# Patient Record
Sex: MALE | Race: OTHER | HISPANIC OR LATINO | URBAN - METROPOLITAN AREA
[De-identification: names, ages, dates, MRNs, and addresses within clinical notes are randomized per-mention and may not be internally consistent; named-entity substitution may affect disease eponyms.]

---

## 2019-02-16 ENCOUNTER — EMERGENCY (EMERGENCY)
Facility: HOSPITAL | Age: 54
LOS: 1 days | Discharge: HOME | End: 2019-02-16
Attending: EMERGENCY MEDICINE
Payer: MEDICAID

## 2019-02-16 VITALS
WEIGHT: 145.06 LBS | TEMPERATURE: 98 F | HEART RATE: 81 BPM | HEIGHT: 68 IN | RESPIRATION RATE: 20 BRPM | DIASTOLIC BLOOD PRESSURE: 97 MMHG | SYSTOLIC BLOOD PRESSURE: 153 MMHG | OXYGEN SATURATION: 96 %

## 2019-02-16 LAB
ALBUMIN SERPL ELPH-MCNC: 4.5 G/DL — SIGNIFICANT CHANGE UP (ref 3.3–5.2)
ALP SERPL-CCNC: 71 U/L — SIGNIFICANT CHANGE UP (ref 40–120)
ALT FLD-CCNC: 16 U/L — SIGNIFICANT CHANGE UP
ANION GAP SERPL CALC-SCNC: 12 MMOL/L — SIGNIFICANT CHANGE UP (ref 5–17)
APTT BLD: 28.5 SEC — SIGNIFICANT CHANGE UP (ref 27.5–36.3)
AST SERPL-CCNC: 16 U/L — SIGNIFICANT CHANGE UP
BILIRUB SERPL-MCNC: 0.3 MG/DL — LOW (ref 0.4–2)
BUN SERPL-MCNC: 13 MG/DL — SIGNIFICANT CHANGE UP (ref 8–20)
CALCIUM SERPL-MCNC: 9.6 MG/DL — SIGNIFICANT CHANGE UP (ref 8.6–10.2)
CHLORIDE SERPL-SCNC: 102 MMOL/L — SIGNIFICANT CHANGE UP (ref 98–107)
CO2 SERPL-SCNC: 23 MMOL/L — SIGNIFICANT CHANGE UP (ref 22–29)
CREAT SERPL-MCNC: 0.98 MG/DL — SIGNIFICANT CHANGE UP (ref 0.5–1.3)
GLUCOSE SERPL-MCNC: 97 MG/DL — SIGNIFICANT CHANGE UP (ref 70–115)
HCT VFR BLD CALC: 43.8 % — SIGNIFICANT CHANGE UP (ref 42–52)
HGB BLD-MCNC: 15.4 G/DL — SIGNIFICANT CHANGE UP (ref 14–18)
INR BLD: 1.01 RATIO — SIGNIFICANT CHANGE UP (ref 0.88–1.16)
MAGNESIUM SERPL-MCNC: 1.8 MG/DL — SIGNIFICANT CHANGE UP (ref 1.6–2.6)
MCHC RBC-ENTMCNC: 32.9 PG — HIGH (ref 27–31)
MCHC RBC-ENTMCNC: 35.2 G/DL — SIGNIFICANT CHANGE UP (ref 32–36)
MCV RBC AUTO: 93.6 FL — SIGNIFICANT CHANGE UP (ref 80–94)
NT-PROBNP SERPL-SCNC: 19 PG/ML — SIGNIFICANT CHANGE UP (ref 0–300)
PLATELET # BLD AUTO: 310 K/UL — SIGNIFICANT CHANGE UP (ref 150–400)
POTASSIUM SERPL-MCNC: 3.8 MMOL/L — SIGNIFICANT CHANGE UP (ref 3.5–5.3)
POTASSIUM SERPL-SCNC: 3.8 MMOL/L — SIGNIFICANT CHANGE UP (ref 3.5–5.3)
PROT SERPL-MCNC: 7.4 G/DL — SIGNIFICANT CHANGE UP (ref 6.6–8.7)
PROTHROM AB SERPL-ACNC: 11.6 SEC — SIGNIFICANT CHANGE UP (ref 10–12.9)
RBC # BLD: 4.68 M/UL — SIGNIFICANT CHANGE UP (ref 4.6–6.2)
RBC # FLD: 12.7 % — SIGNIFICANT CHANGE UP (ref 11–15.6)
SODIUM SERPL-SCNC: 137 MMOL/L — SIGNIFICANT CHANGE UP (ref 135–145)
TROPONIN T SERPL-MCNC: <0.01 NG/ML — SIGNIFICANT CHANGE UP (ref 0–0.06)
WBC # BLD: 10.8 K/UL — SIGNIFICANT CHANGE UP (ref 4.8–10.8)
WBC # FLD AUTO: 10.8 K/UL — SIGNIFICANT CHANGE UP (ref 4.8–10.8)

## 2019-02-16 PROCEDURE — 99220: CPT

## 2019-02-16 PROCEDURE — 71045 X-RAY EXAM CHEST 1 VIEW: CPT | Mod: 26

## 2019-02-16 PROCEDURE — 93010 ELECTROCARDIOGRAM REPORT: CPT | Mod: 76

## 2019-02-16 RX ORDER — NICOTINE POLACRILEX 2 MG
1 GUM BUCCAL ONCE
Qty: 0 | Refills: 0 | Status: COMPLETED | OUTPATIENT
Start: 2019-02-16 | End: 2019-02-16

## 2019-02-16 RX ORDER — METOPROLOL TARTRATE 50 MG
25 TABLET ORAL DAILY
Qty: 0 | Refills: 0 | Status: DISCONTINUED | OUTPATIENT
Start: 2019-02-16 | End: 2019-02-17

## 2019-02-16 RX ORDER — ASPIRIN/CALCIUM CARB/MAGNESIUM 324 MG
81 TABLET ORAL ONCE
Qty: 0 | Refills: 0 | Status: COMPLETED | OUTPATIENT
Start: 2019-02-16 | End: 2019-02-16

## 2019-02-16 RX ADMIN — Medication 81 MILLIGRAM(S): at 20:19

## 2019-02-16 RX ADMIN — Medication 25 MILLIGRAM(S): at 23:18

## 2019-02-16 RX ADMIN — Medication 1 PATCH: at 23:17

## 2019-02-16 NOTE — ED CDU PROVIDER INITIAL DAY NOTE - MEDICAL DECISION MAKING DETAILS
obs for suspcisous chest pain with some risk factors; tele monitor; serial enzymes; echo; cards consult

## 2019-02-16 NOTE — ED PROVIDER NOTE - CLINICAL SUMMARY MEDICAL DECISION MAKING FREE TEXT BOX
chest pain with risk facotrs; [facility] paged for consult in AM; will place on obs for serial ecg and troponins; cards consult

## 2019-02-16 NOTE — ED ADULT NURSE NOTE - OBJECTIVE STATEMENT
Patient is alert and verbal, report chest pain onset a few days ago but was worse today. patient took own aspirin at home. report improvement at this time. hx: HTN

## 2019-02-16 NOTE — ED CDU PROVIDER INITIAL DAY NOTE - OBJECTIVE STATEMENT
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 [place]; since then the pain is present every day, non radiating, but occurs randomly, cannot recall association with activity; no assoc diaphoresis, no leg pain/swelling, no recent travel; no SOB; +took aspirin today  pt is smokes less than a pack a day  since age 23 ,  smokes marijuana since age 23 , pt states drink 6 pack of beer every day, denies using any other illicit drugs  pt denies any leg swollen - cough or cough blood or any hx of blood clotting in lung or legs   pt states he had stress test done x 3 years ago in [facility] ( can not recall the dr name or group  as per pt was negative result) pt denies any chest pain now

## 2019-02-16 NOTE — ED PROVIDER NOTE - OBJECTIVE STATEMENT
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

## 2019-02-16 NOTE — ED ADULT NURSE NOTE - CAS EDN DISCHARGE ASSESSMENT
Dressing clean and dry/Patient baseline mental status/Alert and oriented to person, place and time/Awake

## 2019-02-17 VITALS
OXYGEN SATURATION: 99 % | TEMPERATURE: 98 F | DIASTOLIC BLOOD PRESSURE: 91 MMHG | HEART RATE: 77 BPM | SYSTOLIC BLOOD PRESSURE: 155 MMHG | RESPIRATION RATE: 18 BRPM

## 2019-02-17 DIAGNOSIS — R07.89 OTHER CHEST PAIN: ICD-10-CM

## 2019-02-17 LAB
TROPONIN T SERPL-MCNC: <0.01 NG/ML — SIGNIFICANT CHANGE UP (ref 0–0.06)
TROPONIN T SERPL-MCNC: <0.01 NG/ML — SIGNIFICANT CHANGE UP (ref 0–0.06)

## 2019-02-17 PROCEDURE — 83735 ASSAY OF MAGNESIUM: CPT

## 2019-02-17 PROCEDURE — 85730 THROMBOPLASTIN TIME PARTIAL: CPT

## 2019-02-17 PROCEDURE — 85610 PROTHROMBIN TIME: CPT

## 2019-02-17 PROCEDURE — 93005 ELECTROCARDIOGRAM TRACING: CPT

## 2019-02-17 PROCEDURE — G0378: CPT

## 2019-02-17 PROCEDURE — 93306 TTE W/DOPPLER COMPLETE: CPT

## 2019-02-17 PROCEDURE — 80053 COMPREHEN METABOLIC PANEL: CPT

## 2019-02-17 PROCEDURE — 85027 COMPLETE CBC AUTOMATED: CPT

## 2019-02-17 PROCEDURE — 71045 X-RAY EXAM CHEST 1 VIEW: CPT

## 2019-02-17 PROCEDURE — 99217: CPT

## 2019-02-17 PROCEDURE — 83880 ASSAY OF NATRIURETIC PEPTIDE: CPT

## 2019-02-17 PROCEDURE — 84484 ASSAY OF TROPONIN QUANT: CPT

## 2019-02-17 PROCEDURE — 99284 EMERGENCY DEPT VISIT MOD MDM: CPT | Mod: 25

## 2019-02-17 PROCEDURE — 93306 TTE W/DOPPLER COMPLETE: CPT | Mod: 26

## 2019-02-17 PROCEDURE — 93010 ELECTROCARDIOGRAM REPORT: CPT

## 2019-02-17 PROCEDURE — 36415 COLL VENOUS BLD VENIPUNCTURE: CPT

## 2019-02-17 RX ORDER — FAMOTIDINE 10 MG/ML
20 INJECTION INTRAVENOUS ONCE
Qty: 0 | Refills: 0 | Status: COMPLETED | OUTPATIENT
Start: 2019-02-17 | End: 2019-02-17

## 2019-02-17 RX ORDER — FAMOTIDINE 10 MG/ML
20 INJECTION INTRAVENOUS ONCE
Qty: 0 | Refills: 0 | Status: DISCONTINUED | OUTPATIENT
Start: 2019-02-17 | End: 2019-02-17

## 2019-02-17 RX ORDER — METOPROLOL TARTRATE 50 MG
1 TABLET ORAL
Qty: 60 | Refills: 0 | OUTPATIENT
Start: 2019-02-17 | End: 2019-03-18

## 2019-02-17 RX ORDER — ASPIRIN/CALCIUM CARB/MAGNESIUM 324 MG
81 TABLET ORAL DAILY
Qty: 0 | Refills: 0 | Status: DISCONTINUED | OUTPATIENT
Start: 2019-02-17 | End: 2019-02-21

## 2019-02-17 RX ORDER — METOPROLOL TARTRATE 50 MG
25 TABLET ORAL
Qty: 0 | Refills: 0 | Status: DISCONTINUED | OUTPATIENT
Start: 2019-02-17 | End: 2019-02-21

## 2019-02-17 RX ADMIN — FAMOTIDINE 20 MILLIGRAM(S): 10 INJECTION INTRAVENOUS at 05:10

## 2019-02-17 RX ADMIN — Medication 1 PATCH: at 06:33

## 2019-02-17 RX ADMIN — Medication 30 MILLILITER(S): at 05:10

## 2019-02-17 RX ADMIN — Medication 25 MILLIGRAM(S): at 05:09

## 2019-02-17 NOTE — ED CDU PROVIDER SUBSEQUENT DAY NOTE - MEDICAL DECISION MAKING DETAILS
52 y/o M with risk factors for CAD, left sided CP for several weeks, and negative troponin x 3.  Echo pending, will need stress test outpatient as per cardiology consult.

## 2019-02-17 NOTE — ED CDU PROVIDER SUBSEQUENT DAY NOTE - HISTORY
Report and care accepted from Ashely PEÑA.  ED, CDU, and cardiology notes, and results reviewed.  52 y/o M presented to ED with c/o several week of sharp intermittent left sided chest pain  without radiation of pain.  He has a 30 pack year smoking history, HTN, and a twin brother that  at 42 of MI.  At present time denies chest pain or SOB.

## 2019-02-17 NOTE — CONSULT NOTE ADULT - PROBLEM SELECTOR RECOMMENDATION 9
-cardiac vs GI vs musculoskeletal etiology  -telemetry overnight  -troponin x3   -nitro prn for CP  -Mylanta/Pepcid x1  -start Metoprolol 25 mg bid  -continue ASA 81 mg daily  -multiple risk factors for CAD-->will need echo and stress test, likely outpatient

## 2019-02-17 NOTE — ED CDU PROVIDER SUBSEQUENT DAY NOTE - ATTENDING CONTRIBUTION TO CARE
Seen and evaluated with ACP, case discussed:  52 y/o  male presented for chest pain, intermittent x several weeks, sharp without radiation  significant smoking hx  twin brother who  of MI by patient report  placed in observation for diagnostic intensity  included serial trop/ecg and cardiology testing  trop neg x 3, ecg unchanged  seen by cardiology, report reviewed by ACP  for inpatient echo with disposition   for outpatient stress test if echo within normal limits

## 2019-02-17 NOTE — ED CDU PROVIDER DISPOSITION NOTE - CLINICAL COURSE
54 y/o M with pmh HTN, current smoker and twin brother that  of MI at age 42.  Patient denies CP or SOB, echo reviewed.  Will need to f/u with cardiology for outpatient stress test.  Metoprolol 25 mg BID and asa 81 mg daily.  Smoking cessation discussed with patient.

## 2019-02-17 NOTE — ED CDU PROVIDER DISPOSITION NOTE - ATTENDING CONTRIBUTION TO CARE
seen and evaluated with acp  placed in obs for chest pain  serial trop negative  cardiology consulted  echo results reviewed, cleared for d/c  for outpatient stress test as per cardiology  patient understands and agrees with plan

## 2019-02-17 NOTE — ED ADULT NURSE REASSESSMENT NOTE - NS ED NURSE REASSESS COMMENT FT1
2nd trop resulted negative. 3rd trop blood draw scheduled for 0600. Pt resting in stretcher in NAD, NSR on cardiac monitor, oxygen saturation WNL.
3rd troponin resulted negative. Pt denies any chest pain at this time. Bed locked in lowest position, call bell within reach.
EKG completed and given to MARIANA Ridley. Pt requesting nicotine patch, pt smokes 1 pack a day. Order for nicotine patch received from PA, RN administered. BP elevated, PA made aware, metoprolol ordered. All questions and concerns addressed. Comfort measures provided including pillow and blanket. Pt resting in stretcher in NAD, pt wife at bedside.
Pt alert and oriented, no apparent distress noted at this time. Pt handed off to atilio POON in stable condition.
assumed care of pt @ 2147, report received from SAUD, RN, charting as noted. Pt AOx4 in NAD. HR is NSR on cardiac monitor, pt denies any chest pain at this time. lung sounds are clear b/l, abd is soft and nontender with positive bowel sounds in all four quadrants, skin is warm, dry and appropriate for age and race. pt educated on plan of care and observation stay. Plan of care taught back to RN. Proficiency determined from successful pt teach back. Pt oriented to unit, staff, and room. Pt reeducated on call bell use. Bed locked in lowest position, call bell within reach. All questions and concerns addressed.
pt resting in stretcher in NAD at this time. Bed locked in lowest position, call bell within reach.
verbal report rec'd from CDU RN Josi, assumed care of pt, no obvious distress noted

## 2019-02-17 NOTE — CONSULT NOTE ADULT - SUBJECTIVE AND OBJECTIVE BOX
History obtained by: Patient and medical record     obtained: No    Chief complaint:  "I have chest pain on and off"    HPI:  This is 54 y/o male with hx of HTN who presents with chest pain for a few weeks. Pain is non-radiating, localized to one spot on the left side, intermittent and not associated with any particular activity. Deep breathing and stretching out makes it better. Pt denies SOB, palpitations or leg swelling but endorses a lot of gas and burping. He occasionally takes omeprazole for his symptoms. Pt has a hx of HTN and used to take Toprol and Ramipril but his doctor discontinued them about 3 years ago. Pt denies any prior cardiac issues. He had an exercise stress test a couple of years ago. Pt is a current daily tobacco and marihuana smoker and drinks 6 beers a day. His identical twin brother  of acute MI at age 42.   In the ER pt was found to have elevated BP (153/97), first troponin negative and normal proBNP. EKG shows SR 81 bpm with no ST changes, no ectopy on telemetry.      REVIEW OF SYMPTOMS:   Cardiovascular: as per HPI  Respiratory:   denies dyspnea,   cough,     Genitourinary:  No dysuria, no hematuria;   Gastrointestinal:   No dark color stool, no melena, no diarrhea, no constipation, no abdominal pain;   Neurological: No headache, no dizziness, no slurred speech;    Psychiatric: No agitation, no anxiety.  ALL OTHER REVIEW OF SYSTEMS ARE NEGATIVE.    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 as     CIGARETTES: smokes 1 ppd for 30 years    ALCOHOL: drinks 6 beers a day    DRUGS: marihuana daily      Vital Signs Last 24 Hrs  T(C): 36.4 (2019 22:33), Max: 37 (2019 19:21)  T(F): 97.6 (2019 22:33), Max: 98.6 (2019 19:21)  HR: 77 (2019 22:33) (77 - 81)  BP: 153/93 (2019 22:33) (153/93 - 158/99)  BP(mean): --  RR: 18 (2019 22:33) (18 - 20)  SpO2: 98% (2019 22:33) (96% - 99%)    PHYSICAL EXAM:  General: WN/WD NAD  Neurology: A&Ox3, nonfocal, HUGHES x 4  Eyes: PERRL/ EOMI, Gross vision intact  ENT/Neck: Neck supple, trachea midline, No JVD, Gross hearing intact  Respiratory: CTA B/L, No wheezing, rales, rhonchi  CV: RRR, S1S2, no murmurs  Abdominal: Soft, NT, ND +BS,   Extremities: No edema, + peripheral pulses  Skin: No Rashes, Hematoma, Ecchymosis      INTERPRETATION OF TELEMETRY: SR 70's-80's, no ectopy    ECG: SR 81 bpm, no ST changes      I&O's Detail      LABS:                        15.4   10.8  )-----------( 310      ( 2019 19:46 )             43.8     02-16    137  |  102  |  13.0  ----------------------------<  97  3.8   |  23.0  |  0.98    Ca    9.6      2019 19:46  Mg     1.8     02-16    TPro  7.4  /  Alb  4.5  /  TBili  0.3<L>  /  DBili  x   /  AST  16  /  ALT  16  /  AlkPhos  71  02-16    CARDIAC MARKERS ( 2019 00:06 )  x     / <0.01 ng/mL / x     / x     / x      CARDIAC MARKERS ( 2019 19:46 )  x     / <0.01 ng/mL / x     / x     / x          PT/INR - ( 2019 19:46 )   PT: 11.6 sec;   INR: 1.01 ratio         PTT - ( 2019 19:46 )  PTT:28.5 sec    I&O's Summary      RADIOLOGY & ADDITIONAL STUDIES:  X-ray:    PREVIOUS DIAGNOSTIC TESTING:      ECHO: n/a    STRESS: n/a      CATHETERIZATION: n/a

## 2019-03-07 ENCOUNTER — INPATIENT (INPATIENT)
Facility: HOSPITAL | Age: 54
LOS: 0 days | Discharge: HOME | DRG: 287 | End: 2019-03-08
Attending: HOSPITALIST | Admitting: INTERNAL MEDICINE
Payer: MEDICAID

## 2019-03-07 VITALS
HEIGHT: 68 IN | DIASTOLIC BLOOD PRESSURE: 99 MMHG | HEART RATE: 92 BPM | RESPIRATION RATE: 18 BRPM | WEIGHT: 145.06 LBS | TEMPERATURE: 99 F | SYSTOLIC BLOOD PRESSURE: 146 MMHG

## 2019-03-07 DIAGNOSIS — I25.10 ATHEROSCLEROTIC HEART DISEASE OF NATIVE CORONARY ARTERY WITHOUT ANGINA PECTORIS: ICD-10-CM

## 2019-03-07 LAB
ALBUMIN SERPL ELPH-MCNC: 4.4 G/DL — SIGNIFICANT CHANGE UP (ref 3.3–5.2)
ALP SERPL-CCNC: 80 U/L — SIGNIFICANT CHANGE UP (ref 40–120)
ALT FLD-CCNC: 24 U/L — SIGNIFICANT CHANGE UP
ANION GAP SERPL CALC-SCNC: 14 MMOL/L — SIGNIFICANT CHANGE UP (ref 5–17)
APPEARANCE UR: CLEAR — SIGNIFICANT CHANGE UP
APTT BLD: 26.3 SEC — LOW (ref 27.5–36.3)
AST SERPL-CCNC: 19 U/L — SIGNIFICANT CHANGE UP
BILIRUB SERPL-MCNC: 0.4 MG/DL — SIGNIFICANT CHANGE UP (ref 0.4–2)
BILIRUB UR-MCNC: NEGATIVE — SIGNIFICANT CHANGE UP
BUN SERPL-MCNC: 12 MG/DL — SIGNIFICANT CHANGE UP (ref 8–20)
CALCIUM SERPL-MCNC: 9.4 MG/DL — SIGNIFICANT CHANGE UP (ref 8.6–10.2)
CHLORIDE SERPL-SCNC: 103 MMOL/L — SIGNIFICANT CHANGE UP (ref 98–107)
CO2 SERPL-SCNC: 22 MMOL/L — SIGNIFICANT CHANGE UP (ref 22–29)
COLOR SPEC: YELLOW — SIGNIFICANT CHANGE UP
CREAT SERPL-MCNC: 1.05 MG/DL — SIGNIFICANT CHANGE UP (ref 0.5–1.3)
DIFF PNL FLD: ABNORMAL
EPI CELLS # UR: SIGNIFICANT CHANGE UP
GLUCOSE SERPL-MCNC: 128 MG/DL — HIGH (ref 70–115)
GLUCOSE UR QL: NEGATIVE MG/DL — SIGNIFICANT CHANGE UP
HCT VFR BLD CALC: 43.2 % — SIGNIFICANT CHANGE UP (ref 42–52)
HGB BLD-MCNC: 15 G/DL — SIGNIFICANT CHANGE UP (ref 14–18)
INR BLD: 0.92 RATIO — SIGNIFICANT CHANGE UP (ref 0.88–1.16)
KETONES UR-MCNC: ABNORMAL
LEUKOCYTE ESTERASE UR-ACNC: NEGATIVE — SIGNIFICANT CHANGE UP
MCHC RBC-ENTMCNC: 32.5 PG — HIGH (ref 27–31)
MCHC RBC-ENTMCNC: 34.7 G/DL — SIGNIFICANT CHANGE UP (ref 32–36)
MCV RBC AUTO: 93.7 FL — SIGNIFICANT CHANGE UP (ref 80–94)
NITRITE UR-MCNC: NEGATIVE — SIGNIFICANT CHANGE UP
NT-PROBNP SERPL-SCNC: 11 PG/ML — SIGNIFICANT CHANGE UP (ref 0–300)
PH UR: 6 — SIGNIFICANT CHANGE UP (ref 5–8)
PLATELET # BLD AUTO: 279 K/UL — SIGNIFICANT CHANGE UP (ref 150–400)
POTASSIUM SERPL-MCNC: 4.2 MMOL/L — SIGNIFICANT CHANGE UP (ref 3.5–5.3)
POTASSIUM SERPL-SCNC: 4.2 MMOL/L — SIGNIFICANT CHANGE UP (ref 3.5–5.3)
PROT SERPL-MCNC: 7 G/DL — SIGNIFICANT CHANGE UP (ref 6.6–8.7)
PROT UR-MCNC: NEGATIVE MG/DL — SIGNIFICANT CHANGE UP
PROTHROM AB SERPL-ACNC: 10.6 SEC — SIGNIFICANT CHANGE UP (ref 10–12.9)
RBC # BLD: 4.61 M/UL — SIGNIFICANT CHANGE UP (ref 4.6–6.2)
RBC # FLD: 12.3 % — SIGNIFICANT CHANGE UP (ref 11–15.6)
SODIUM SERPL-SCNC: 139 MMOL/L — SIGNIFICANT CHANGE UP (ref 135–145)
SP GR SPEC: 1.02 — SIGNIFICANT CHANGE UP (ref 1.01–1.02)
TROPONIN T SERPL-MCNC: <0.01 NG/ML — SIGNIFICANT CHANGE UP (ref 0–0.06)
UROBILINOGEN FLD QL: NEGATIVE MG/DL — SIGNIFICANT CHANGE UP
WBC # BLD: 10.4 K/UL — SIGNIFICANT CHANGE UP (ref 4.8–10.8)
WBC # FLD AUTO: 10.4 K/UL — SIGNIFICANT CHANGE UP (ref 4.8–10.8)

## 2019-03-07 PROCEDURE — 99406 BEHAV CHNG SMOKING 3-10 MIN: CPT

## 2019-03-07 PROCEDURE — 93010 ELECTROCARDIOGRAM REPORT: CPT

## 2019-03-07 PROCEDURE — 71045 X-RAY EXAM CHEST 1 VIEW: CPT | Mod: 26

## 2019-03-07 PROCEDURE — 99223 1ST HOSP IP/OBS HIGH 75: CPT | Mod: 25

## 2019-03-07 PROCEDURE — 99285 EMERGENCY DEPT VISIT HI MDM: CPT

## 2019-03-07 PROCEDURE — 75574 CT ANGIO HRT W/3D IMAGE: CPT | Mod: 26

## 2019-03-07 PROCEDURE — 99255 IP/OBS CONSLTJ NEW/EST HI 80: CPT

## 2019-03-07 RX ORDER — METOPROLOL TARTRATE 50 MG
25 TABLET ORAL
Qty: 0 | Refills: 0 | Status: DISCONTINUED | OUTPATIENT
Start: 2019-03-07 | End: 2019-03-08

## 2019-03-07 RX ORDER — ASPIRIN/CALCIUM CARB/MAGNESIUM 324 MG
81 TABLET ORAL DAILY
Qty: 0 | Refills: 0 | Status: DISCONTINUED | OUTPATIENT
Start: 2019-03-07 | End: 2019-03-08

## 2019-03-07 RX ORDER — NITROGLYCERIN 6.5 MG
1 CAPSULE, EXTENDED RELEASE ORAL ONCE
Qty: 0 | Refills: 0 | Status: COMPLETED | OUTPATIENT
Start: 2019-03-07 | End: 2019-03-07

## 2019-03-07 RX ORDER — FAMOTIDINE 10 MG/ML
20 INJECTION INTRAVENOUS ONCE
Qty: 0 | Refills: 0 | Status: COMPLETED | OUTPATIENT
Start: 2019-03-07 | End: 2019-03-07

## 2019-03-07 RX ORDER — METOPROLOL TARTRATE 50 MG
50 TABLET ORAL ONCE
Qty: 0 | Refills: 0 | Status: COMPLETED | OUTPATIENT
Start: 2019-03-07 | End: 2019-03-07

## 2019-03-07 RX ORDER — ONDANSETRON 8 MG/1
4 TABLET, FILM COATED ORAL EVERY 6 HOURS
Qty: 0 | Refills: 0 | Status: DISCONTINUED | OUTPATIENT
Start: 2019-03-07 | End: 2019-03-08

## 2019-03-07 RX ORDER — HYDRALAZINE HCL 50 MG
10 TABLET ORAL EVERY 8 HOURS
Qty: 0 | Refills: 0 | Status: DISCONTINUED | OUTPATIENT
Start: 2019-03-07 | End: 2019-03-08

## 2019-03-07 RX ORDER — NICOTINE POLACRILEX 2 MG
1 GUM BUCCAL DAILY
Qty: 0 | Refills: 0 | Status: DISCONTINUED | OUTPATIENT
Start: 2019-03-07 | End: 2019-03-08

## 2019-03-07 RX ORDER — ACETAMINOPHEN 500 MG
650 TABLET ORAL EVERY 6 HOURS
Qty: 0 | Refills: 0 | Status: DISCONTINUED | OUTPATIENT
Start: 2019-03-07 | End: 2019-03-08

## 2019-03-07 RX ORDER — ATORVASTATIN CALCIUM 80 MG/1
40 TABLET, FILM COATED ORAL AT BEDTIME
Qty: 0 | Refills: 0 | Status: DISCONTINUED | OUTPATIENT
Start: 2019-03-07 | End: 2019-03-08

## 2019-03-07 RX ORDER — ASPIRIN/CALCIUM CARB/MAGNESIUM 324 MG
325 TABLET ORAL ONCE
Qty: 0 | Refills: 0 | Status: COMPLETED | OUTPATIENT
Start: 2019-03-07 | End: 2019-03-07

## 2019-03-07 RX ADMIN — Medication 325 MILLIGRAM(S): at 12:31

## 2019-03-07 RX ADMIN — FAMOTIDINE 20 MILLIGRAM(S): 10 INJECTION INTRAVENOUS at 12:31

## 2019-03-07 RX ADMIN — Medication 50 MILLIGRAM(S): at 15:24

## 2019-03-07 RX ADMIN — Medication 50 MILLIGRAM(S): at 15:07

## 2019-03-07 RX ADMIN — Medication 1 INCH(S): at 19:27

## 2019-03-07 NOTE — ED PROVIDER NOTE - CHPI ED SYMPTOMS NEG
no shortness of breath/no dizziness/no back pain/no cough/no fever/no nausea/no syncope/no diaphoresis/no vomiting/no chills

## 2019-03-07 NOTE — ED ADULT TRIAGE NOTE - CHIEF COMPLAINT QUOTE
patient reports left sided chest pain. Patient reports he has had this pain for past couple weeks, pain is intermittent stabbing. associated increased belching and gas

## 2019-03-07 NOTE — ED ADULT NURSE NOTE - CHIEF COMPLAINT
I, the Attending Physician, certify the above stated patient is homebound and upon completion of the Face-To-Face encounter, has the need for intermittent skilled nursing, physical therapy and/or speech or occupational therapy services in their home for their current diagnosis as outlined in their initiial plan of care. These services will continue to be monitored by myself or another physician The patient is a 53y Male complaining of chest pain.

## 2019-03-07 NOTE — ED ADULT NURSE NOTE - OBJECTIVE STATEMENT
A&Ox3, resp wnl, c/o light pain to left chest , c/o gas pain, belching, appears comfortable, denies shortness of breathe

## 2019-03-07 NOTE — H&P ADULT - HISTORY OF PRESENT ILLNESS
Pt is a 54 yo M presenting from home with chest pain, PMH Tobacco dependence, HTN.   Patient states for the last 4 weeks he has been having worsening chest pain, he states the pain is the anterior sternum, feels like a tightness worse with exertion and relieved with rest Pt is a 52 yo M presenting from home with chest pain, PMH Tobacco dependence, HTN.   Patient states for the last 4 weeks he has been having worsening chest pain, he states the pain is the anterior sternum, feels like a tightness worse with exertion and relieved with rest, denies PND or orthopnea, positive fam hx of early CAD in patients brother who  at 42 of an MI.   Patient is an active smoker up to 1ppd, he denies any diaphoresis or SOB, no radiation of pain elsewhere. No past hx of MI, angina prior to one month ago.   Of note was seen in ED around , trop negative and discharge to home recommended outpat stress test but patient did not follow up.   Denies headaches, nausea, vomiting, SOB, palpitations, abdominal pain, constipation, diarrhea, melena, hematochezia, dysuria. His chest pain is currently resolved.    In ED patient had Coronary CT done which showed obstructive disease, planned for Cath in AM by cardio.

## 2019-03-07 NOTE — ED PROVIDER NOTE - CLINICAL SUMMARY MEDICAL DECISION MAKING FREE TEXT BOX
The patient presents with atypical chest pain but CTCA is positive for obstructive CAD and will admit for further evaluation

## 2019-03-07 NOTE — H&P ADULT - ASSESSMENT
Pt is a 52 yo M presenting from home with chest pain found to have abnormal cardiac CT, PMH Tobacco dependence, HTN.     Chest Pain: likely stable angina.   -Cardiac CT shows Coronary artery calcium score:480. >90th percentile. Significant coronary   calcification.  -trend troponin, EKG without acute ST-T wave changes, no signs of ischemia.   -plan for Cath in AM.  -Check TTE  -resume ASA and Lopressor, start statin, check lipids and A1c  -NPO after midnight  -will hold pharm DVT ppx until after cath.    HTN: BP elevated, hydralazine PRN  -patient received nitro paste which will also help with BP    Tobacco Dependence: Patient was counselled on tobacco cessation. He was informed of the increased risk of lung cancer and cardiovascular disease, COPD among other health risks associated with smoking and tobacco use. Cessation was advised. 3 min spent on counselling. agreeable to nicoderm Pt is a 52 yo M presenting from home with chest pain found to have abnormal cardiac CT, PMH Tobacco dependence, HTN.     Chest Pain: likely stable angina.   -Cardiac CT shows Coronary artery calcium score:480. >90th percentile. Significant coronary   calcification.  -trend troponin, EKG without acute ST-T wave changes, no signs of ischemia.   -plan for Cath in AM.  -Check TTE  -resume ASA and Lopressor, start statin, check lipids and A1c  -NPO after midnight  -will hold pharm DVT ppx until after cath.    HTN: BP elevated, hydralazine PRN  -patient received nitro paste which will also help with BP    Tobacco Dependence: Patient was counselled on tobacco cessation. He was informed of the increased risk of lung cancer and cardiovascular disease, COPD among other health risks associated with smoking and tobacco use. Cessation was advised. 3 min spent on counselling. agreeable to nicoderm    DVT ppx: SCD's, will avoid pharm DVT as patient planned for cath in AM    Full code.

## 2019-03-07 NOTE — ED PROVIDER NOTE - OBJECTIVE STATEMENT
The patient is a 53 year old male presents with chest pain described as burning, pressure intermittent. No SOB, No ABD Pain, The patient is a 53 year old male presents with chest pain described as burning, pressure intermittent. No SOB, No ABD Pain, The patient has history of HTN, HLD, brother  of MI at age of 42

## 2019-03-07 NOTE — ED ADULT NURSE REASSESSMENT NOTE - NS ED NURSE REASSESS COMMENT FT1
pt returned from Main CT, awaiting results, CM sr VR 68-70, denies any pain, comfortable, family at bedside
Pt back from cardiac CTA.  Cardiac monitor showing normal sinus rhythm, hypertension 183/105.  Pt has no change in chest pain.  Mild sharp stabbing pain unchanged since arrival.  Nitro BId applied to chest wall.  will continue to monitor and repeat blood pressure.  Pt verbalized understanding of plan of care including NPO after midnight for AM cardiac catheterization.  Given meal tray.

## 2019-03-07 NOTE — CONSULT NOTE ADULT - SUBJECTIVE AND OBJECTIVE BOX
CARDIOLOGY CONSULTATION NOTE     History obtained by: Patient, family and medical record     obtained: No    Chief complaint:    Patient is a 53y old  Male who presents with a chief complaint of CP    HPI:    53M smoker with hx HTN, HLD (Untreated), family history of premature CAD (Identical twin brother  of MI at age 42) presented to ED with 3 weeks history of CP which has been on and off, felt like sharp pains, in the L side, lasting few minutes  and off, nonexertional, nonradiated denied modifying factors, denied associated sign and symptoms  He was seen for similar symptoms in the ER 3 weeks ago and was ruled out for MI, outpt stress test was recommended. He didn't have insurance, thus couldn't have the test done, stating he had insurance approval just recently Denied fever, chills dyspnea, orthopnea PND, edema, palpitation hematuria, melena syncope, near syncope.      REVIEW OF SYMPTOMS:   Constitutional: Denied: fever, chills, weight loss or gain  Eyes: Denied: reddened ayes, eye discharge, eye pain  ENMT: Denied: ear pain, nasal discharge, mouth pain, throat pain or swelling  Cardiovascular: Denied: palpitation, arrhythmia, irregular or fast heart beats, edema  Respiratory: Denied: cough, phlegm production, wheezes, dyspnea, orthopnea, PND,   GI: Denied: Abdominal pain, nausea, vomiting, diarrhea, constipation, melena, rectal bleed  : Denied: hematuria, frequency  Musculoskeletal: Denied: Muscle aches, weakness, pain  Hematology/lymp: Denied: Bleeding disorder, anemia, blood clotting  Neuro: Denied: Headache, light headedness, dizziness, numbness, aphasia, dysarthria, seizure,  syncope, near syncope  Psych: Denied: depression, anxiety  Integumentary/skin: Denied: rash, bruises, ecchymosis, itching  Allergy/Immunology: denied environmental or drug allergies    ALL OTHER REVIEW OF SYSTEMS ARE NEGATIVE.    MEDICATIONS  (STANDING):  Metoprolol 25mg twice daily    MEDICATIONS  (PRN):        PAST MEDICAL & SURGICAL HISTORY:  HTN (hypertension)  No significant past surgical history      FAMILY HISTORY:  + Premature CAD, brother  of MI at age 42    SOCIAL HISTORY: +Tobacco smoker    CIGARETTES:  YES    ALCOHOL: No    DRUGS: No    Vital Signs Last 24 Hrs  T(C): 37 (07 Mar 2019 11:12), Max: 37 (07 Mar 2019 11:12)  T(F): 98.6 (07 Mar 2019 11:12), Max: 98.6 (07 Mar 2019 11:12)  HR: 92 (07 Mar 2019 11:12) (92 - 92)  BP: 146/99 (07 Mar 2019 11:12) (146/99 - 146/99)  BP(mean): --  RR: 18 (07 Mar 2019 12:37) (18 - 18)  SpO2: 100% (07 Mar 2019 12:37) (100% - 100%)    PHYSICAL EXAM:      Constitutional: No fever, chills, NAD, Comfortable    Eyes: Not reddened, no discharge    ENMT: No discharge, No pain    Neck: No JVD, No Bruit    Back: No CVA tenderness    Respiratory: Clear to auscultation bilaterally    Cardiovascular: RRR, Normal S1-2, No S3-, No friction rub murmur    Gastrointestinal: Soft, NT/ND. BS+, No organomegaly    Extremities: No edema    Vascular: Distal pulses intact    Neurological: Alert, awake, oriented, able to move extremities, speach is clear    Skin: No ecchymosis, no rash    Musculoskeletal: Non tender, no weaknss    Psychiatric: Aproppriate mood, no anxiety        INTERPRETATION OF TELEMETRY:    ECG: SR    I&O's Detail      LABS:                        15.0   10.4  )-----------( 279      ( 07 Mar 2019 12:21 )             43.2     03-07    139  |  103  |  12.0  ----------------------------<  128<H>  4.2   |  22.0  |  1.05    Ca    9.4      07 Mar 2019 12:21    TPro  7.0  /  Alb  4.4  /  TBili  0.4  /  DBili  x   /  AST  19  /  ALT  24  /  AlkPhos  80  03-07    CARDIAC MARKERS ( 07 Mar 2019 12:21 )  x     / <0.01 ng/mL / x     / x     / x          PT/INR - ( 07 Mar 2019 12:21 )   PT: 10.6 sec;   INR: 0.92 ratio         PTT - ( 07 Mar 2019 12:21 )  PTT:26.3 sec    I&O's Summary

## 2019-03-08 ENCOUNTER — TRANSCRIPTION ENCOUNTER (OUTPATIENT)
Age: 54
End: 2019-03-08

## 2019-03-08 VITALS — DIASTOLIC BLOOD PRESSURE: 91 MMHG | HEART RATE: 73 BPM | SYSTOLIC BLOOD PRESSURE: 160 MMHG

## 2019-03-08 DIAGNOSIS — I20.9 ANGINA PECTORIS, UNSPECIFIED: ICD-10-CM

## 2019-03-08 DIAGNOSIS — E78.5 HYPERLIPIDEMIA, UNSPECIFIED: ICD-10-CM

## 2019-03-08 LAB
ANION GAP SERPL CALC-SCNC: 14 MMOL/L — SIGNIFICANT CHANGE UP (ref 5–17)
BASOPHILS # BLD AUTO: 0 K/UL — SIGNIFICANT CHANGE UP (ref 0–0.2)
BASOPHILS NFR BLD AUTO: 0.2 % — SIGNIFICANT CHANGE UP (ref 0–2)
BUN SERPL-MCNC: 14 MG/DL — SIGNIFICANT CHANGE UP (ref 8–20)
CALCIUM SERPL-MCNC: 9.4 MG/DL — SIGNIFICANT CHANGE UP (ref 8.6–10.2)
CHLORIDE SERPL-SCNC: 101 MMOL/L — SIGNIFICANT CHANGE UP (ref 98–107)
CHOLEST SERPL-MCNC: 234 MG/DL — HIGH (ref 110–199)
CO2 SERPL-SCNC: 26 MMOL/L — SIGNIFICANT CHANGE UP (ref 22–29)
CREAT SERPL-MCNC: 1.14 MG/DL — SIGNIFICANT CHANGE UP (ref 0.5–1.3)
EOSINOPHIL # BLD AUTO: 0.4 K/UL — SIGNIFICANT CHANGE UP (ref 0–0.5)
EOSINOPHIL NFR BLD AUTO: 3.6 % — SIGNIFICANT CHANGE UP (ref 0–5)
GLUCOSE SERPL-MCNC: 95 MG/DL — SIGNIFICANT CHANGE UP (ref 70–115)
HBA1C BLD-MCNC: 5.4 % — SIGNIFICANT CHANGE UP (ref 4–5.6)
HCT VFR BLD CALC: 42.9 % — SIGNIFICANT CHANGE UP (ref 42–52)
HDLC SERPL-MCNC: 35 MG/DL — LOW
HGB BLD-MCNC: 14.6 G/DL — SIGNIFICANT CHANGE UP (ref 14–18)
LIPID PNL WITH DIRECT LDL SERPL: 134 MG/DL — SIGNIFICANT CHANGE UP
LYMPHOCYTES # BLD AUTO: 2.5 K/UL — SIGNIFICANT CHANGE UP (ref 1–4.8)
LYMPHOCYTES # BLD AUTO: 24.5 % — SIGNIFICANT CHANGE UP (ref 20–55)
MCHC RBC-ENTMCNC: 32.2 PG — HIGH (ref 27–31)
MCHC RBC-ENTMCNC: 34 G/DL — SIGNIFICANT CHANGE UP (ref 32–36)
MCV RBC AUTO: 94.5 FL — HIGH (ref 80–94)
MONOCYTES # BLD AUTO: 0.7 K/UL — SIGNIFICANT CHANGE UP (ref 0–0.8)
MONOCYTES NFR BLD AUTO: 6.6 % — SIGNIFICANT CHANGE UP (ref 3–10)
NEUTROPHILS # BLD AUTO: 6.5 K/UL — SIGNIFICANT CHANGE UP (ref 1.8–8)
NEUTROPHILS NFR BLD AUTO: 64.5 % — SIGNIFICANT CHANGE UP (ref 37–73)
PLATELET # BLD AUTO: 260 K/UL — SIGNIFICANT CHANGE UP (ref 150–400)
POTASSIUM SERPL-MCNC: 4.7 MMOL/L — SIGNIFICANT CHANGE UP (ref 3.5–5.3)
POTASSIUM SERPL-SCNC: 4.7 MMOL/L — SIGNIFICANT CHANGE UP (ref 3.5–5.3)
RBC # BLD: 4.54 M/UL — LOW (ref 4.6–6.2)
RBC # FLD: 12.3 % — SIGNIFICANT CHANGE UP (ref 11–15.6)
SODIUM SERPL-SCNC: 141 MMOL/L — SIGNIFICANT CHANGE UP (ref 135–145)
TOTAL CHOLESTEROL/HDL RATIO MEASUREMENT: 7 RATIO — SIGNIFICANT CHANGE UP (ref 3.4–9.6)
TRIGL SERPL-MCNC: 325 MG/DL — HIGH (ref 10–200)
TROPONIN T SERPL-MCNC: <0.01 NG/ML — SIGNIFICANT CHANGE UP (ref 0–0.06)
TROPONIN T SERPL-MCNC: <0.01 NG/ML — SIGNIFICANT CHANGE UP (ref 0–0.06)
WBC # BLD: 10 K/UL — SIGNIFICANT CHANGE UP (ref 4.8–10.8)
WBC # FLD AUTO: 10 K/UL — SIGNIFICANT CHANGE UP (ref 4.8–10.8)

## 2019-03-08 PROCEDURE — 83036 HEMOGLOBIN GLYCOSYLATED A1C: CPT

## 2019-03-08 PROCEDURE — 99233 SBSQ HOSP IP/OBS HIGH 50: CPT

## 2019-03-08 PROCEDURE — 99152 MOD SED SAME PHYS/QHP 5/>YRS: CPT

## 2019-03-08 PROCEDURE — 85610 PROTHROMBIN TIME: CPT

## 2019-03-08 PROCEDURE — 75574 CT ANGIO HRT W/3D IMAGE: CPT

## 2019-03-08 PROCEDURE — 71045 X-RAY EXAM CHEST 1 VIEW: CPT

## 2019-03-08 PROCEDURE — C1887: CPT

## 2019-03-08 PROCEDURE — 93458 L HRT ARTERY/VENTRICLE ANGIO: CPT | Mod: 26

## 2019-03-08 PROCEDURE — 96374 THER/PROPH/DIAG INJ IV PUSH: CPT | Mod: XU

## 2019-03-08 PROCEDURE — 85027 COMPLETE CBC AUTOMATED: CPT

## 2019-03-08 PROCEDURE — 84484 ASSAY OF TROPONIN QUANT: CPT

## 2019-03-08 PROCEDURE — 80061 LIPID PANEL: CPT

## 2019-03-08 PROCEDURE — 93458 L HRT ARTERY/VENTRICLE ANGIO: CPT

## 2019-03-08 PROCEDURE — 99285 EMERGENCY DEPT VISIT HI MDM: CPT | Mod: 25

## 2019-03-08 PROCEDURE — 80048 BASIC METABOLIC PNL TOTAL CA: CPT

## 2019-03-08 PROCEDURE — 93571 IV DOP VEL&/PRESS C FLO 1ST: CPT | Mod: LD

## 2019-03-08 PROCEDURE — 81001 URINALYSIS AUTO W/SCOPE: CPT

## 2019-03-08 PROCEDURE — C1769: CPT

## 2019-03-08 PROCEDURE — 93010 ELECTROCARDIOGRAM REPORT: CPT

## 2019-03-08 PROCEDURE — 99238 HOSP IP/OBS DSCHRG MGMT 30/<: CPT

## 2019-03-08 PROCEDURE — C1894: CPT

## 2019-03-08 PROCEDURE — 99153 MOD SED SAME PHYS/QHP EA: CPT

## 2019-03-08 PROCEDURE — 93005 ELECTROCARDIOGRAM TRACING: CPT

## 2019-03-08 PROCEDURE — 36415 COLL VENOUS BLD VENIPUNCTURE: CPT

## 2019-03-08 PROCEDURE — 83880 ASSAY OF NATRIURETIC PEPTIDE: CPT

## 2019-03-08 PROCEDURE — 80053 COMPREHEN METABOLIC PANEL: CPT

## 2019-03-08 PROCEDURE — 93571 IV DOP VEL&/PRESS C FLO 1ST: CPT | Mod: 26,LD

## 2019-03-08 PROCEDURE — 85730 THROMBOPLASTIN TIME PARTIAL: CPT

## 2019-03-08 RX ORDER — ATORVASTATIN CALCIUM 80 MG/1
1 TABLET, FILM COATED ORAL
Qty: 30 | Refills: 0
Start: 2019-03-08 | End: 2019-04-06

## 2019-03-08 RX ORDER — METOPROLOL TARTRATE 50 MG
1 TABLET ORAL
Qty: 60 | Refills: 0
Start: 2019-03-08 | End: 2019-04-06

## 2019-03-08 RX ORDER — METOPROLOL TARTRATE 50 MG
1 TABLET ORAL
Qty: 0 | Refills: 0 | COMMUNITY
Start: 2019-03-08

## 2019-03-08 RX ADMIN — Medication 1 INCH(S): at 08:00

## 2019-03-08 RX ADMIN — Medication 81 MILLIGRAM(S): at 08:52

## 2019-03-08 RX ADMIN — Medication 650 MILLIGRAM(S): at 01:00

## 2019-03-08 RX ADMIN — ATORVASTATIN CALCIUM 40 MILLIGRAM(S): 80 TABLET, FILM COATED ORAL at 00:11

## 2019-03-08 RX ADMIN — Medication 25 MILLIGRAM(S): at 16:44

## 2019-03-08 RX ADMIN — Medication 25 MILLIGRAM(S): at 05:13

## 2019-03-08 RX ADMIN — Medication 1 PATCH: at 16:43

## 2019-03-08 RX ADMIN — Medication 650 MILLIGRAM(S): at 00:11

## 2019-03-08 NOTE — DISCHARGE NOTE ADULT - PLAN OF CARE
pain free continue aspirin , statin for high cholesterol   stop smoking   follow up with cardiologist as needed cont lipitor, watch your diet

## 2019-03-08 NOTE — PROGRESS NOTE ADULT - PROBLEM SELECTOR PLAN 1
cardiac cath today   cont aspirin , B blocker   cardiology follow up appreciated   life style cahges need to stop smoking counseling given

## 2019-03-08 NOTE — DISCHARGE NOTE ADULT - MEDICATION SUMMARY - MEDICATIONS TO CHANGE
I will SWITCH the dose or number of times a day I take the medications listed below when I get home from the hospital:    metoprolol tartrate 25 mg oral tablet  -- 1 tab(s) by mouth 2 times a day   -- It is very important that you take or use this exactly as directed.  Do not skip doses or discontinue unless directed by your doctor.  May cause drowsiness.  Alcohol may intensify this effect.  Use care when operating dangerous machinery.  Some non-prescription drugs may aggravate your condition.  Read all labels carefully.  If a warning appears, check with your doctor before taking.  Take with food or milk.  This drug may impair the ability to drive or operate machinery.  Use care until you become familiar with its effects.

## 2019-03-08 NOTE — DISCHARGE NOTE ADULT - ADDITIONAL INSTRUCTIONS
s/p cardiac cath: Restricted use with no heavy lifting of affected arm for 48 hours.  No submerging the arm in water for 48 hours.  You may start showering today.  Call your doctor for any bleeding, swelling, loss of sensation in the hand or fingers, or fingers turning blue.  If heavy bleeding or large lumps form, hold pressure at the spot and come to the Emergency Room.  Follow up with Dr. Mattson in one to two weeks.

## 2019-03-08 NOTE — DISCHARGE NOTE ADULT - PATIENT PORTAL LINK FT
You can access the [facility] Patient Portal, offered by [facility], by registering with the following website: [email or web address]

## 2019-03-08 NOTE — DISCHARGE NOTE ADULT - CARE PLAN
Principal Discharge DX:	Coronary artery disease involving native coronary artery of native heart with angina pectoris  Goal:	pain free  Assessment and plan of treatment:	continue aspirin , statin for high cholesterol   stop smoking   follow up with cardiologist as needed  Secondary Diagnosis:	Hyperlipidemia LDL goal <100  Assessment and plan of treatment:	cont lipitor, watch your diet

## 2019-03-08 NOTE — DISCHARGE NOTE ADULT - MEDICATION SUMMARY - MEDICATIONS TO TAKE
I will START or STAY ON the medications listed below when I get home from the hospital:    aspirin 81 mg oral tablet, chewable  -- 1 tab(s) by mouth once a day  -- Indication: For Cad     atorvastatin 40 mg oral tablet  -- 1 tab(s) by mouth once a day (at bedtime)  -- Indication: For Hyperlipidemia LDL goal <100    metoprolol tartrate 25 mg oral tablet  -- 1 tab(s) by mouth once a day  -- Indication: For Home meds I will START or STAY ON the medications listed below when I get home from the hospital:    aspirin 81 mg oral tablet, chewable  -- 1 tab(s) by mouth once a day  -- Indication: For Cad     atorvastatin 40 mg oral tablet  -- 1 tab(s) by mouth once a day (at bedtime)  -- Indication: For Hyperlipidemia LDL goal <100    metoprolol tartrate 25 mg oral tablet  -- 1 tab(s) by mouth 2 times a day  -- Indication: For Hypertension

## 2019-03-08 NOTE — PROGRESS NOTE ADULT - ASSESSMENT
53M smoker with hx HTN, HLD (Untreated), family history of premature CAD (Identical twin brother  of MI at age 42) presented to ED with 3 weeks history of CP       Chest Pain:   Trop neg  EKG No acute changes   CTA of coronaries done, results as above, abnl  LHC planned today with Dr. Sawyer      HTN  Controlled  Monitor BP    Smoking  Cessation d/w pt    HLD  Cont statin          CARDIO MEDS  hydrALAZINE Injectable 10 milliGRAM(s) IV Push every 8 hours PRN  metoprolol tartrate 25 milliGRAM(s) Oral two times a day  atorvastatin 40 milliGRAM(s) Oral at bedtime  aspirin  chewable 81 milliGRAM(s) Oral daily

## 2019-03-08 NOTE — PROGRESS NOTE ADULT - SUBJECTIVE AND OBJECTIVE BOX
Interventional Cardiology NP Precath Note    HPI: 54 yo male admitted with chest pain, S/P CTA which revealed LAD disease. Normal EF.        ALL: NKDA, NKFA. Denies shellfish/Contrast dye allergy.  PAST MEDICAL & SURGICAL HISTORY:  HTN (hypertension)  No significant past surgical history    SocHX: + Tobaccoism     MEDS:   acetaminophen   Tablet .. 650 milliGRAM(s) Oral every 6 hours PRN  aspirin  chewable 81 milliGRAM(s) Oral daily  atorvastatin 40 milliGRAM(s) Oral at bedtime  hydrALAZINE Injectable 10 milliGRAM(s) IV Push every 8 hours PRN  metoprolol tartrate 25 milliGRAM(s) Oral two times a day  nicotine - 21 mG/24Hr(s) Patch 1 patch Transdermal daily  ondansetron Injectable 4 milliGRAM(s) IV Push every 6 hours PRN    T(C): 36.4 (03-08-19 @ 09:13), Max: 37.3 (03-07-19 @ 19:32)  HR: 78 (03-08-19 @ 09:13) (65 - 92)  BP: 135/70 (03-08-19 @ 09:13) (110/80 - 183/105)  RR: 16 (03-08-19 @ 09:13) (16 - 189)  SpO2: 96% (03-08-19 @ 09:13) (96% - 100%)  Wt(kg): --  NEURO: A & O x 3, no focal neurologic deficits  HEENT: NCAT, EOMI, PERRLA  NECK: No JVD, No carotid bruits B/L, +2 Carotid pulses B/L  PULM:  CTA B/L No W/R/R  CARD: RRR, +S1, +S2, No M/R/G  ABD: ND, +BS, NT, no masses  EXT: Warm, pedal edema yes/no, pitting/non-pitting  PULSES: + 2 bilat                          14.6   10.0  )-----------( 260      ( 08 Mar 2019 05:50 )             42.9     03-08    141  |  101  |  14.0  ----------------------------<  95  4.7   |  26.0  |  1.14    Ca    9.4      08 Mar 2019 05:50    TPro  7.0  /  Alb  4.4  /  TBili  0.4  /  DBili  x   /  AST  19  /  ALT  24  /  AlkPhos  80  03-07    CARDIAC MARKERS ( 08 Mar 2019 05:50 )  x     / <0.01 ng/mL / x     / x     / x      CARDIAC MARKERS ( 08 Mar 2019 02:08 )  x     / <0.01 ng/mL / x     / x     / x      CARDIAC MARKERS ( 07 Mar 2019 12:21 )  x     / <0.01 ng/mL / x     / x     / x

## 2019-03-08 NOTE — PROGRESS NOTE ADULT - ASSESSMENT
Plan: PRE-PROCEDURE ASSESSMENT    -Patient seen and examined  -Labs reviewed  -Pre-procedure teaching completed with patient   -Questions answered about patients concerns     - BR 1.1   - ASA 2   Mallampati 2

## 2019-03-08 NOTE — DISCHARGE NOTE ADULT - CARE PROVIDER_API CALL
Darien Mattson)  Cardiovascular Disease  [address] [facility], [place] [address]  Phone: [phone]  Fax: [phone]  Follow Up Time:

## 2019-03-08 NOTE — PROGRESS NOTE ADULT - SUBJECTIVE AND OBJECTIVE BOX
Patient is a 53y old  Male who presents with a chief complaint of Chest Pain   he is seen earlier today , pain free , he denies any recurrent pain since admission   labs lipid profile result reviewed with the estelle   d/w the cardiologist       HPI:  Pt is a 54 yo M presenting from home with chest pain, PMH Tobacco dependence, HTN.   Patient states for the last 4 weeks he has been having worsening chest pain, he states the pain is the anterior sternum, feels like a tightness worse with exertion and relieved with rest, denies PND or orthopnea, positive fam hx of early CAD in patients brother who  at 42 of an MI.   Patient is an active smoker up to 1ppd, he denies any diaphoresis or SOB, no radiation of pain elsewhere. No past hx of MI, angina prior to one month ago.   Of note was seen in ED around , trop negative and discharge to home recommended outpat stress test but patient did not follow up.   Denies headaches, nausea, vomiting, SOB, palpitations, abdominal pain, constipation, diarrhea, melena, hematochezia, dysuria. His chest pain is currently resolved.    In ED patient had Coronary CT done which showed obstructive disease, planned for Cath in AM by cardio. (07 Mar 2019 21:38)      Allergies    No Known Allergies    Intolerances          Vital Signs Last 24 Hrs  T(C): 36.4 (08 Mar 2019 09:13), Max: 37.3 (07 Mar 2019 19:32)  T(F): 97.6 (08 Mar 2019 09:13), Max: 99.1 (07 Mar 2019 19:32)  HR: 78 (08 Mar 2019 09:13) (65 - 80)  BP: 135/70 (08 Mar 2019 09:13) (110/80 - 183/105)  BP(mean): --  RR: 16 (08 Mar 2019 09:13) (16 - 189)  SpO2: 96% (08 Mar 2019 09:13) (96% - 100%)    PHYSICAL EXAM:    GENERAL: NAD, well-groomed, well-developed  NECK: Supple, No JVD, Normal thyroid  NERVOUS SYSTEM:  Alert & Oriented X3, Good concentration; no motor deficits   CHEST/LUNG: Clear to auscultation bilaterally; No rales, rhonchi, wheezing  HEART: Regular rate and rhythm; S1 /S2 No murmurs, rubs, or  ABDOMEN: Soft, Nontender, Nondistended; Bowel sounds present  EXTREMITIES:  No clubbing, cyanosis, or edema      Lipid Profile in AM (19 @ 05:50)    Total Cholesterol/HDL Ratio Measurement: 7.0 Ratio    Cholesterol, Serum: 234 mg/dL    Triglycerides, Serum: 325 mg/dL    HDL Cholesterol, Serum: 35: HDL Levels >/= 60 mg/dL are considered beneficial and a "negative" risk  factor.  Effective 08/15/2018: New reference range and interpretive comment. mg/dL    Direct LDL: 134: LDL Cholesterol (mg/dL) --- Interpretive Comment (for adults 18 and over)  Optimal LDL Level may vary based on clinical situation  Below 70                   Ideal for people at very high risk of heart  disease  Below 100                  Ideal for people at risk of heart disease  100 - 129                    [place]  130 - 159                    Borderline high  160 - 189                    High  190 and Above           Very high mg/dL        LABS:                        14.6   10.0  )-----------( 260      ( 08 Mar 2019 05:50 )             42.9     03-08    141  |  101  |  14.0  ----------------------------<  95  4.7   |  26.0  |  1.14    Ca    9.4      08 Mar 2019 05:50    TPro  7.0  /  Alb  4.4  /  TBili  0.4  /  DBili  x   /  AST  19  /  ALT  24  /  AlkPhos  80  03-07    PT/INR - ( 07 Mar 2019 12:21 )   PT: 10.6 sec;   INR: 0.92 ratio         PTT - ( 07 Mar 2019 12:21 )  PTT:26.3 sec  Urinalysis Basic - ( 07 Mar 2019 15:23 )    Color: Yellow / Appearance: Clear / S.020 / pH: x  Gluc: x / Ketone: Trace  / Bili: Negative / Urobili: Negative mg/dL   Blood: x / Protein: Negative mg/dL / Nitrite: Negative   Leuk Esterase: Negative / RBC: x / WBC x   Sq Epi: x / Non Sq Epi: Occasional / Bacteria: x        RADIOLOGY & ADDITIONAL TESTS:

## 2019-03-08 NOTE — PATIENT PROFILE ADULT - VISION (WITH CORRECTIVE LENSES IF THE PATIENT USUALLY WEARS THEM):
trouble seeing distance/Partially impaired: cannot see medication labels or newsprint, but can see obstacles in path, and the surrounding layout; can count fingers at arm's length

## 2019-03-08 NOTE — PROGRESS NOTE ADULT - PROBLEM SELECTOR PLAN 2
educated about the risk and importance of taking statins, diet   he will follow up with his  PCP  for repeat blood tests in several weeks on statin

## 2019-03-08 NOTE — PROGRESS NOTE ADULT - SUBJECTIVE AND OBJECTIVE BOX
Cardiology NP post procedure note:       Pt doing well s/p LHC that revealed nonobstructive CAD. Denies complaint.     MEDICATIONS  (STANDING):  aspirin  chewable 81 milliGRAM(s) Oral daily  atorvastatin 40 milliGRAM(s) Oral at bedtime  metoprolol tartrate 25 milliGRAM(s) Oral two times a day  nicotine - 21 mG/24Hr(s) Patch 1 patch Transdermal daily    MEDICATIONS  (PRN):  acetaminophen   Tablet .. 650 milliGRAM(s) Oral every 6 hours PRN Mild Pain (1 - 3)  hydrALAZINE Injectable 10 milliGRAM(s) IV Push every 8 hours PRN SBP>180  ondansetron Injectable 4 milliGRAM(s) IV Push every 6 hours PRN Nausea      Allergies:   No Known Allergies        PAST MEDICAL & SURGICAL HISTORY:  HTN (hypertension)  No significant past surgical history      Vital Signs Last 24 Hrs  T(C): 36.4 (08 Mar 2019 09:13), Max: 37.3 (07 Mar 2019 19:32)  T(F): 97.6 (08 Mar 2019 09:13), Max: 99.1 (07 Mar 2019 19:32)  HR: 75 (08 Mar 2019 12:40) (65 - 80)  BP: 157/98 (08 Mar 2019 12:40) (110/80 - 183/105)  BP(mean): --  RR: 15 (08 Mar 2019 12:40) (14 - 189)  SpO2: 100% (08 Mar 2019 12:40) (96% - 100%)    Physical Exam:  Constitutional: NAD, AAOx3  Cardiovascular: +S1S2 RRR  Pulmonary: CTA b/l, unlabored  Abd: soft NTND +BS  Right radial band in placed; no bleeding, hematoma, edema; RUE warm/mobile/acyanotic; +1 right ulnar pulse  Extremities: no pedal edema, +distal pulses b/l  Neuro: non focal, HUGHES x4    LABS:                        14.6   10.0  )-----------( 260      ( 08 Mar 2019 05:50 )             42.9     03-08    141  |  101  |  14.0  ----------------------------<  95  4.7   |  26.0  |  1.14    Ca    9.4      08 Mar 2019 05:50    TPro  7.0  /  Alb  4.4  /  TBili  0.4  /  DBili  x   /  AST  19  /  ALT  24  /  AlkPhos  80  03-07    PT/INR - ( 07 Mar 2019 12:21 )   PT: 10.6 sec;   INR: 0.92 ratio         PTT - ( 07 Mar 2019 12:21 )  PTT:26.3 sec  Urinalysis Basic - ( 07 Mar 2019 15:23 )    Color: Yellow / Appearance: Clear / S.020 / pH: x  Gluc: x / Ketone: Trace  / Bili: Negative / Urobili: Negative mg/dL   Blood: x / Protein: Negative mg/dL / Nitrite: Negative   Leuk Esterase: Negative / RBC: x / WBC x   Sq Epi: x / Non Sq Epi: Occasional / Bacteria: x        Assessment:   Pt is a 54 yo M presenting from home with chest pain, PMH Tobacco dependence, HTN.   Patient states for the last 4 weeks he has been having worsening chest pain, he states the pain is the anterior sternum, feels like a tightness worse with exertion and relieved with rest, denies PND or orthopnea, positive fam hx of early CAD in patients brother who  at 42 of an MI.   Patient is an active smoker up to 1ppd, he denies any diaphoresis or SOB, no radiation of pain elsewhere. No past hx of MI, angina prior to one month ago.   Of note was seen in ED around , trop negative and discharge to home recommended outpat stress test but patient did not follow up.   Denies headaches, nausea, vomiting, SOB, palpitations, abdominal pain, constipation, diarrhea, melena, hematochezia, dysuria. His chest pain is currently resolved.    In ED patient had Coronary CT done which showed obstructive disease.  Now s/p LHC that revealed nonobstructive CAD (prelim report--official report to follow).     Plan:   Patient stable for discharge hometoday from cardiac perspective after required 3 hour recovery post procedure (by 3pm).  Follow up with Dr. Darien Mattson in one to two weeks as outpatient  Remove radial band at 1400 then OOB and ambulate  Access site care and activity limitations reviewed w/ pt.   Med management for nonobstructive CAD with baby ASA and statin as per Dr. Sawyer  Smoking cessation emphasized with patient verbal understanding  Discussed with Dr. Sawyer and Dr. Christine Mattson

## 2019-03-08 NOTE — PROGRESS NOTE ADULT - ASSESSMENT
53M smoker with hx HTN, HLD (Untreated), family history of premature CAD (Identical twin brother  of MI at age 42) presented to ED with 3 weeks history of CP , seen by cardiology , CTA of coronaries done now for cardiac cath to evaluate the CAD and need for intervention, pain resolved

## 2019-03-12 PROBLEM — Z00.00 ENCOUNTER FOR PREVENTIVE HEALTH EXAMINATION: Status: ACTIVE | Noted: 2019-03-12

## 2019-03-25 DIAGNOSIS — Z86.79 PERSONAL HISTORY OF OTHER DISEASES OF THE CIRCULATORY SYSTEM: ICD-10-CM

## 2019-03-25 DIAGNOSIS — I25.10 ATHEROSCLEROTIC HEART DISEASE OF NATIVE CORONARY ARTERY W/OUT ANGINA PECTORIS: ICD-10-CM

## 2019-03-25 DIAGNOSIS — Z82.49 FAMILY HISTORY OF ISCHEMIC HEART DISEASE AND OTHER DISEASES OF THE CIRCULATORY SYSTEM: ICD-10-CM

## 2019-03-25 RX ORDER — ASPIRIN 81 MG/1
81 TABLET, CHEWABLE ORAL DAILY
Qty: 90 | Refills: 1 | Status: ACTIVE | COMMUNITY
Start: 2019-03-25

## 2019-03-26 ENCOUNTER — APPOINTMENT (OUTPATIENT)
Dept: CARDIOLOGY | Facility: CLINIC | Age: 54
End: 2019-03-26
Payer: MEDICAID

## 2019-03-26 ENCOUNTER — NON-APPOINTMENT (OUTPATIENT)
Age: 54
End: 2019-03-26

## 2019-03-26 VITALS
SYSTOLIC BLOOD PRESSURE: 119 MMHG | OXYGEN SATURATION: 97 % | HEIGHT: 68 IN | DIASTOLIC BLOOD PRESSURE: 80 MMHG | RESPIRATION RATE: 14 BRPM | WEIGHT: 155 LBS | BODY MASS INDEX: 23.49 KG/M2 | HEART RATE: 78 BPM

## 2019-03-26 VITALS — DIASTOLIC BLOOD PRESSURE: 82 MMHG | SYSTOLIC BLOOD PRESSURE: 122 MMHG

## 2019-03-26 PROCEDURE — 93000 ELECTROCARDIOGRAM COMPLETE: CPT

## 2019-03-26 PROCEDURE — 99214 OFFICE O/P EST MOD 30 MIN: CPT | Mod: 25

## 2019-03-26 RX ORDER — FAMOTIDINE 40 MG/1
40 TABLET, FILM COATED ORAL DAILY
Qty: 30 | Refills: 2 | Status: ACTIVE | COMMUNITY
Start: 1900-01-01 | End: 1900-01-01

## 2019-08-08 ENCOUNTER — APPOINTMENT (OUTPATIENT)
Dept: CARDIOLOGY | Facility: CLINIC | Age: 54
End: 2019-08-08
Payer: MEDICAID

## 2019-08-08 ENCOUNTER — NON-APPOINTMENT (OUTPATIENT)
Age: 54
End: 2019-08-08

## 2019-08-08 VITALS
BODY MASS INDEX: 23.64 KG/M2 | HEIGHT: 68 IN | RESPIRATION RATE: 96 BRPM | HEART RATE: 82 BPM | DIASTOLIC BLOOD PRESSURE: 70 MMHG | WEIGHT: 156 LBS | SYSTOLIC BLOOD PRESSURE: 108 MMHG

## 2019-08-08 DIAGNOSIS — I10 ESSENTIAL (PRIMARY) HYPERTENSION: ICD-10-CM

## 2019-08-08 DIAGNOSIS — E78.5 HYPERLIPIDEMIA, UNSPECIFIED: ICD-10-CM

## 2019-08-08 DIAGNOSIS — I25.10 ATHEROSCLEROTIC HEART DISEASE OF NATIVE CORONARY ARTERY W/OUT ANGINA PECTORIS: ICD-10-CM

## 2019-08-08 DIAGNOSIS — Z71.6 TOBACCO ABUSE COUNSELING: ICD-10-CM

## 2019-08-08 PROCEDURE — 99215 OFFICE O/P EST HI 40 MIN: CPT

## 2019-08-08 PROCEDURE — 93000 ELECTROCARDIOGRAM COMPLETE: CPT

## 2019-08-18 LAB
CHOLEST SERPL-MCNC: 177 MG/DL
CHOLEST/HDLC SERPL: 4 RATIO
HDLC SERPL-MCNC: 44 MG/DL
LDLC SERPL CALC-MCNC: 110 MG/DL
TRIGL SERPL-MCNC: 114 MG/DL

## 2019-10-21 ENCOUNTER — MEDICATION RENEWAL (OUTPATIENT)
Age: 54
End: 2019-10-21

## 2019-10-21 RX ORDER — ATORVASTATIN CALCIUM 40 MG/1
40 TABLET, FILM COATED ORAL
Qty: 90 | Refills: 1 | Status: ACTIVE | COMMUNITY
Start: 2019-03-25 | End: 1900-01-01

## 2020-04-30 ENCOUNTER — APPOINTMENT (OUTPATIENT)
Dept: CARDIOLOGY | Facility: CLINIC | Age: 55
End: 2020-04-30

## 2020-05-21 ENCOUNTER — RX RENEWAL (OUTPATIENT)
Age: 55
End: 2020-05-21

## 2020-07-23 ENCOUNTER — APPOINTMENT (OUTPATIENT)
Dept: CARDIOLOGY | Facility: CLINIC | Age: 55
End: 2020-07-23

## 2020-08-27 RX ORDER — METOPROLOL TARTRATE 25 MG/1
25 TABLET, FILM COATED ORAL
Qty: 60 | Refills: 0 | Status: ACTIVE | COMMUNITY
Start: 2019-03-25 | End: 1900-01-01

## 2020-09-23 ENCOUNTER — RX CHANGE (OUTPATIENT)
Age: 55
End: 2020-09-23

## 2021-07-13 ENCOUNTER — EMERGENCY (EMERGENCY)
Facility: HOSPITAL | Age: 56
LOS: 1 days | Discharge: HOME | End: 2021-07-13
Attending: EMERGENCY MEDICINE
Payer: COMMERCIAL

## 2021-07-13 VITALS
TEMPERATURE: 98 F | RESPIRATION RATE: 20 BRPM | HEART RATE: 82 BPM | HEIGHT: 68 IN | DIASTOLIC BLOOD PRESSURE: 77 MMHG | OXYGEN SATURATION: 98 % | WEIGHT: 149.91 LBS | SYSTOLIC BLOOD PRESSURE: 123 MMHG

## 2021-07-13 LAB
ALBUMIN SERPL ELPH-MCNC: 4.5 G/DL — SIGNIFICANT CHANGE UP (ref 3.3–5.2)
ALP SERPL-CCNC: 85 U/L — SIGNIFICANT CHANGE UP (ref 40–120)
ALT FLD-CCNC: 17 U/L — SIGNIFICANT CHANGE UP
ANION GAP SERPL CALC-SCNC: 15 MMOL/L — SIGNIFICANT CHANGE UP (ref 5–17)
AST SERPL-CCNC: 31 U/L — SIGNIFICANT CHANGE UP
BASOPHILS # BLD AUTO: 0.04 K/UL — SIGNIFICANT CHANGE UP (ref 0–0.2)
BASOPHILS NFR BLD AUTO: 0.4 % — SIGNIFICANT CHANGE UP (ref 0–2)
BILIRUB SERPL-MCNC: 0.7 MG/DL — SIGNIFICANT CHANGE UP (ref 0.4–2)
BUN SERPL-MCNC: 7.6 MG/DL — LOW (ref 8–20)
CALCIUM SERPL-MCNC: 9.5 MG/DL — SIGNIFICANT CHANGE UP (ref 8.6–10.2)
CHLORIDE SERPL-SCNC: 100 MMOL/L — SIGNIFICANT CHANGE UP (ref 98–107)
CO2 SERPL-SCNC: 21 MMOL/L — LOW (ref 22–29)
CREAT SERPL-MCNC: 1.05 MG/DL — SIGNIFICANT CHANGE UP (ref 0.5–1.3)
EOSINOPHIL # BLD AUTO: 0.13 K/UL — SIGNIFICANT CHANGE UP (ref 0–0.5)
EOSINOPHIL NFR BLD AUTO: 1.4 % — SIGNIFICANT CHANGE UP (ref 0–6)
GLUCOSE SERPL-MCNC: 97 MG/DL — SIGNIFICANT CHANGE UP (ref 70–99)
HCT VFR BLD CALC: 45.8 % — SIGNIFICANT CHANGE UP (ref 39–50)
HGB BLD-MCNC: 15.8 G/DL — SIGNIFICANT CHANGE UP (ref 13–17)
IMM GRANULOCYTES NFR BLD AUTO: 0.2 % — SIGNIFICANT CHANGE UP (ref 0–1.5)
LYMPHOCYTES # BLD AUTO: 1.63 K/UL — SIGNIFICANT CHANGE UP (ref 1–3.3)
LYMPHOCYTES # BLD AUTO: 17.3 % — SIGNIFICANT CHANGE UP (ref 13–44)
MCHC RBC-ENTMCNC: 32.4 PG — SIGNIFICANT CHANGE UP (ref 27–34)
MCHC RBC-ENTMCNC: 34.5 GM/DL — SIGNIFICANT CHANGE UP (ref 32–36)
MCV RBC AUTO: 93.9 FL — SIGNIFICANT CHANGE UP (ref 80–100)
MONOCYTES # BLD AUTO: 0.57 K/UL — SIGNIFICANT CHANGE UP (ref 0–0.9)
MONOCYTES NFR BLD AUTO: 6 % — SIGNIFICANT CHANGE UP (ref 2–14)
NEUTROPHILS # BLD AUTO: 7.04 K/UL — SIGNIFICANT CHANGE UP (ref 1.8–7.4)
NEUTROPHILS NFR BLD AUTO: 74.7 % — SIGNIFICANT CHANGE UP (ref 43–77)
PLATELET # BLD AUTO: 280 K/UL — SIGNIFICANT CHANGE UP (ref 150–400)
POTASSIUM SERPL-MCNC: 4 MMOL/L — SIGNIFICANT CHANGE UP (ref 3.5–5.3)
POTASSIUM SERPL-SCNC: 4 MMOL/L — SIGNIFICANT CHANGE UP (ref 3.5–5.3)
PROT SERPL-MCNC: 7.6 G/DL — SIGNIFICANT CHANGE UP (ref 6.6–8.7)
RBC # BLD: 4.88 M/UL — SIGNIFICANT CHANGE UP (ref 4.2–5.8)
RBC # FLD: 12 % — SIGNIFICANT CHANGE UP (ref 10.3–14.5)
SODIUM SERPL-SCNC: 136 MMOL/L — SIGNIFICANT CHANGE UP (ref 135–145)
TSH SERPL-MCNC: 1.51 UIU/ML — SIGNIFICANT CHANGE UP (ref 0.27–4.2)
WBC # BLD: 9.43 K/UL — SIGNIFICANT CHANGE UP (ref 3.8–10.5)
WBC # FLD AUTO: 9.43 K/UL — SIGNIFICANT CHANGE UP (ref 3.8–10.5)

## 2021-07-13 PROCEDURE — 84443 ASSAY THYROID STIM HORMONE: CPT

## 2021-07-13 PROCEDURE — 70360 X-RAY EXAM OF NECK: CPT | Mod: 26

## 2021-07-13 PROCEDURE — 85025 COMPLETE CBC W/AUTO DIFF WBC: CPT

## 2021-07-13 PROCEDURE — 71250 CT THORAX DX C-: CPT | Mod: 26,MA

## 2021-07-13 PROCEDURE — 70490 CT SOFT TISSUE NECK W/O DYE: CPT | Mod: 26,MA

## 2021-07-13 PROCEDURE — 71045 X-RAY EXAM CHEST 1 VIEW: CPT | Mod: 26

## 2021-07-13 PROCEDURE — 71045 X-RAY EXAM CHEST 1 VIEW: CPT

## 2021-07-13 PROCEDURE — 71250 CT THORAX DX C-: CPT

## 2021-07-13 PROCEDURE — 99284 EMERGENCY DEPT VISIT MOD MDM: CPT | Mod: 25

## 2021-07-13 PROCEDURE — 70360 X-RAY EXAM OF NECK: CPT

## 2021-07-13 PROCEDURE — 36415 COLL VENOUS BLD VENIPUNCTURE: CPT

## 2021-07-13 PROCEDURE — 80053 COMPREHEN METABOLIC PANEL: CPT

## 2021-07-13 PROCEDURE — 70490 CT SOFT TISSUE NECK W/O DYE: CPT

## 2021-07-13 PROCEDURE — 99285 EMERGENCY DEPT VISIT HI MDM: CPT

## 2021-07-13 NOTE — ED PROVIDER NOTE - PATIENT PORTAL LINK FT
You can access the FollowMyHealth Patient Portal offered by [facility] by registering at the following website: [email or web address]. By joining [facility]’s FollowMyHealth portal, you will also be able to view your health information using other applications (apps) compatible with our system.

## 2021-07-13 NOTE — ED PROVIDER NOTE - NSFOLLOWUPINSTRUCTIONS_ED_ALL_ED_FT
Follow up with Dr. Truong, CT surgeon as directed.      Please return to the emergency department immediately should you feel worse in any way or have any of the following symptoms:    •	especially increased or different pain  •	 fevers  •	persistent vomiting  •	shaking chills     Please follow up with the Doctor listed within the time frame specified. Thank you for coming to the emergency department. We hope you are feeling improved and continue to get better. Have a nice day.

## 2021-07-13 NOTE — ED PROVIDER NOTE - ATTENDING CONTRIBUTION TO CARE
56yoM; with pmh signif for CAD, HTN; now p/w dysphagia/odynophagia x1 week. states he feels solid food getting stuck in his lower neck portion of throat, does not vomit, cough, or drool, but feels like food is stuck in throat.  states he forced himself to vomit 2 days ago and dinner from prior night came up. denies cp/sob/palp. denies n/v. denies f/c/s. denies neck swelling.  General:     NAD  Head:     NC/AT, EOMI, oral mucosa moist  Neck:     trachea midline, no stridor, non-tender of anterior neck, no crepitus  Lungs:     CTA b/l, no w/r/r  CVS:     S1S2, RRR, no m/g/r  Abd:     +BS, s/nt/nd, no organomegaly  Ext:    2+ radial and pedal pulses, no c/c/e  Neuro: AAOx3, no sensory/motor deficits  A/P:  56yoM p/w odynophagia  -ct, labs, pulse ox, re-eval

## 2021-07-13 NOTE — ED ADULT NURSE NOTE - OBJECTIVE STATEMENT
pt comes to ED with reports of diff swallowing, pt states been happening x 1 week. even and unlabored resps present, skin warm dry and intact, airway patent.

## 2021-07-13 NOTE — ED ADULT TRIAGE NOTE - CHIEF COMPLAINT QUOTE
Pt arrives to ED, breathing easy and unlabored,   stating " for the past few days anything I ate I feel it gets stuck in my throat " no drooling noted

## 2021-07-13 NOTE — ED PROVIDER NOTE - OBJECTIVE STATEMENT
56M smoker w/CAD, HTN on ASA/Metoprolol presents to ED c/o new onset difficulty swallowing solid foods for 1 week.  State he has to work to eat solid food because it is getting stuck, liquids ok.  Tried to get appointment with PCM but no availability until next month.  Otherwise denies pain, bleeding, SOB, chest pain, abdominal pain or fevers.  Denies other pertinent medical problems.  Denies any overt substance use.

## 2021-07-13 NOTE — ED PROVIDER NOTE - CARE PROVIDER_API CALL
Nirav Truong)  Thoracic Surgery  [address] [place] [address]  Phone: [phone]  Fax: [phone]  Follow Up Time:

## 2021-07-13 NOTE — ED PROVIDER NOTE - CARE PLAN
Principal Discharge DX:	Difficulty swallowing solids   Principal Discharge DX:	Diverticula, esophagus congenital

## 2021-07-13 NOTE — ED PROVIDER NOTE - CLINICAL SUMMARY MEDICAL DECISION MAKING FREE TEXT BOX
56M w/CAD, HTN on ASA/Metoprolol presents to ED c/o new onset difficulty swallowing solid foods for 1 week. 56M w/CAD, HTN on ASA/Metoprolol presents to ED c/o new onset difficulty swallowing solid foods for 1 week.  CT showed evidence of zenker-like diverticulum.  Spoke to CT surgery who said patient can follow up outpatient with Dr. Truong.  Information given to patient.  Patient can tolerate softs and liquids, so able to get PO.    Patient/family was given full return precautions.  Patient was counseled on red flag symptoms such as fever, severe pain, or focal deficits and advised to return to the ED for these reasons or any reason that was concerning to them.  Patient/ family advised to make close follow up with their primary care provider and specialty clinics (CT SURg- Dr. Truong) to follow up with this visit and continue investigation/treatment.  All questions were answered. Patient/family has shown adequate competence and understanding. Patient/family is agreeable to the plan.

## 2021-07-22 ENCOUNTER — APPOINTMENT (OUTPATIENT)
Dept: THORACIC SURGERY | Facility: CLINIC | Age: 56
End: 2021-07-22
Payer: MEDICAID

## 2021-07-22 DIAGNOSIS — F17.200 NICOTINE DEPENDENCE, UNSPECIFIED, UNCOMPLICATED: ICD-10-CM

## 2021-07-22 DIAGNOSIS — Z78.9 OTHER SPECIFIED HEALTH STATUS: ICD-10-CM

## 2021-07-22 PROCEDURE — 99204 OFFICE O/P NEW MOD 45 MIN: CPT

## 2021-07-22 NOTE — PHYSICAL EXAM
[General Appearance - Alert] : alert [General Appearance - Well Nourished] : well nourished [Sclera] : the sclera and conjunctiva were normal [Outer Ear] : the ears and nose were normal in appearance [Neck Appearance] : the appearance of the neck was normal [Neck Cervical Mass (___cm)] : no neck mass was observed [Exaggerated Use Of Accessory Muscles For Inspiration] : no accessory muscle use [Apical Impulse] : the apical impulse was normal [Heart Sounds] : normal S1 and S2 [Examination Of The Chest] : the chest was normal in appearance [2+] : left 2+ [Breast Appearance] : normal in appearance [Bowel Sounds] : normal bowel sounds [Abdomen Tenderness] : non-tender [Penis Abnormality] : the penis was normal [Testes Swelling] : the testicles were not swollen [Cervical Lymph Nodes Enlarged Posterior Bilaterally] : posterior cervical [Supraclavicular Lymph Nodes Enlarged Bilaterally] : supraclavicular [No CVA Tenderness] : no ~M costovertebral angle tenderness [Abnormal Walk] : normal gait [Nail Clubbing] : no clubbing  or cyanosis of the fingernails [Skin Color & Pigmentation] : normal skin color and pigmentation [] : no rash [Cranial Nerves] : cranial nerves 2-12 were intact [Sensation] : the sensory exam was normal to light touch and pinprick [Oriented To Time, Place, And Person] : oriented to person, place, and time [Affect] : the affect was normal

## 2021-07-22 NOTE — DATA REVIEWED
[FreeTextEntry1] : 7.13.21  CT CHEST OC from [facility]\par INTERPRETATION: CLINICAL INFORMATION: Difficulty swallowing\par COMPARISON: None\par PROCEDURE:\par CT of the Chest was performed.\par Sagittal and coronal reformats were performed.\par FINDINGS:\par LUNGS AND AIRWAYS: Patent central airways. Lungs are hyperinflated with blebs and bullae in the upper lobes. No focal consolidation.\par PLEURA: No pleural effusion.\par MEDIASTINUM AND BECKI: 1.4 x 1.8 cm contrast filled posterior upper esophageal diverticulum at the T1 level. consider correlation with esophagram and/or endoscopy.\par VESSELS: Nonaneurysmal\par HEART: Heart size is normal. Coronary artery calcification. No pericardial effusion.\par CHEST WALL AND LOWER NECK: Within normal limits.\par VISUALIZED UPPER ABDOMEN: Within normal limits.\par BONES: No acute finding.\par IMPRESSION:\par Contrast-filled posterior upper esophageal diverticulum as described. Consider esophagram and/or endoscopic correlation\par Emphysematous changes as described\par \par 7.13.21 Chest Xray from [facility] \par No acute process.\par \par 7.13.21  NECK SOFT TISSUE From [facility] \par No acute radiographic findings\par  \par 7.13.21  EXAM: CT NECK SOFT TISSUE from [facility] \par -No radiopaque foreign body seen along the aerodigestive tract.\par -Focus of air lying posterior to the upper esophagus suspicious for the presence of an esophageal diverticulum. Clinical correlation is recommended.\par  \par

## 2021-07-22 NOTE — HISTORY OF PRESENT ILLNESS
[FreeTextEntry1] : Mr. KEITA is a 56 year old male, current everyday smoker (1 PPD x 34 years), active smoker referred from [facility] 7.13.21 for symptoms of new onset  difficulty swallowing with solid foods for 3 weeks. Past medical history includes CAD, HTN , and  Zenker diverticulum. His Chest Xray obtain was within normal limits, however his  7.13.21  CT  Neck Soft Tissue showed a focus of air lying posterior to the upper esophagus,  suspicious for the presence of an esophageal diverticulum. In addition,  Mr. Keita was ordered for a CT Chest Scan that showed  1.4 x 1.8 cm contrast filled posterior upper esophageal diverticulum at the T1 level.  Today he denies any chest pain, dizziness, palpitations, shortness of breath, fevers, chills, nausea, vomiting, diarrhea or other related symptoms. \par

## 2021-07-22 NOTE — ASSESSMENT
[FreeTextEntry1] : Mr. KEITA is a 56 year old male, current everyday smoker (1 PPD x 34 years), active smoker referred from [facility] 7/13/21 for symptoms of new onset  difficulty swallowing with solid foods for 3 weeks. Past medical history includes CAD, HTN , and  Zenker diverticulum. His Chest Xray obtain was within normal limits, however his  7/13/21  CT  Neck Soft Tissue showed a focus of air lying posterior to the upper esophagus,  suspicious for the presence of an esophageal diverticulum. In addition,  Mr. Keita was ordered for a CT Chest Scan that showed  1.4 x 1.8 cm contrast filled posterior upper esophageal diverticulum at the T1 level.  Today he denies any chest pain, dizziness, palpitations, shortness of breath, fevers, chills, nausea, vomiting, diarrhea or other related symptoms. \par \par \par \par \par PLAN:\par I had an in depth conversation with Mr. Keita today during our office visit. CT scan of the chest performed on 7/13/2021 revealed   1.4 x 1.8 cm contrast filled posterior upper esophageal diverticulum at the T1 level. I am recommending a follow up esophagram for confirm the diagnosis in preparation for cervical diverticulectomy with myotomy. I have discussed the risks and benefits of the procedure in detail. He is in agreement to proceed. He will be scheduled in the next one to two weeks.\par  \par \par PLAN:\par Esophagram \par return for a follow up office visit after esophagagram. \par \par \par "I, Mary Goff, am scribing for and the presence of Dr. Truong the following sections HISTORY OF PRESENT ILLNESS, PAST MEDICAL/FAMILY/SOCIAL HISTORY; REVIEW OF SYSTEMS; VITAL SIGNS; PHYSICAL EXAM; DISPOSITION."\par \par "I personally performed the services described in the documentation, review the documentation recorded by the scribe in my presence and it accurately and completely records my words and actions."\par

## 2021-07-22 NOTE — CONSULT LETTER
[Dear  ___] : Dear  [unfilled], [Courtesy Letter:] : I had the pleasure of seeing your patient, [unfilled], in my office today. [Please see my note below.] : Please see my note below. [Consult Closing:] : Thank you very much for allowing me to participate in the care of this patient.  If you have any questions, please do not hesitate to contact me. [Sincerely,] : Sincerely, [FreeTextEntry3] : Nirav Truong MD\par Director of Thoracic, [facility]\par Cardiovascular & Thoracic Surgery\par [address] [place] [address] \[place] [address]\par Tel: [phone]\par Fax: [phone]\par

## 2021-08-23 ENCOUNTER — OUTPATIENT (OUTPATIENT)
Dept: OUTPATIENT SERVICES | Facility: HOSPITAL | Age: 56
LOS: 1 days | End: 2021-08-23
Payer: COMMERCIAL

## 2021-08-23 VITALS
SYSTOLIC BLOOD PRESSURE: 143 MMHG | HEART RATE: 93 BPM | DIASTOLIC BLOOD PRESSURE: 98 MMHG | TEMPERATURE: 98 F | RESPIRATION RATE: 20 BRPM | WEIGHT: 152.12 LBS | HEIGHT: 68 IN | OXYGEN SATURATION: 98 %

## 2021-08-23 DIAGNOSIS — I10 ESSENTIAL (PRIMARY) HYPERTENSION: ICD-10-CM

## 2021-08-23 DIAGNOSIS — Z01.818 ENCOUNTER FOR OTHER PREPROCEDURAL EXAMINATION: ICD-10-CM

## 2021-08-23 DIAGNOSIS — Z98.890 OTHER SPECIFIED POSTPROCEDURAL STATES: Chronic | ICD-10-CM

## 2021-08-23 DIAGNOSIS — K22.5 DIVERTICULUM OF ESOPHAGUS, ACQUIRED: ICD-10-CM

## 2021-08-23 DIAGNOSIS — Z29.9 ENCOUNTER FOR PROPHYLACTIC MEASURES, UNSPECIFIED: ICD-10-CM

## 2021-08-23 LAB
ALBUMIN SERPL ELPH-MCNC: 4.6 G/DL — SIGNIFICANT CHANGE UP (ref 3.3–5.2)
ALP SERPL-CCNC: 86 U/L — SIGNIFICANT CHANGE UP (ref 40–120)
ALT FLD-CCNC: 12 U/L — SIGNIFICANT CHANGE UP
ANION GAP SERPL CALC-SCNC: 11 MMOL/L — SIGNIFICANT CHANGE UP (ref 5–17)
APTT BLD: 28.4 SEC — SIGNIFICANT CHANGE UP (ref 27.5–35.5)
AST SERPL-CCNC: 14 U/L — SIGNIFICANT CHANGE UP
BASOPHILS # BLD AUTO: 0.04 K/UL — SIGNIFICANT CHANGE UP (ref 0–0.2)
BASOPHILS NFR BLD AUTO: 0.5 % — SIGNIFICANT CHANGE UP (ref 0–2)
BILIRUB SERPL-MCNC: 0.3 MG/DL — LOW (ref 0.4–2)
BLD GP AB SCN SERPL QL: SIGNIFICANT CHANGE UP
BUN SERPL-MCNC: 11.6 MG/DL — SIGNIFICANT CHANGE UP (ref 8–20)
CALCIUM SERPL-MCNC: 9.8 MG/DL — SIGNIFICANT CHANGE UP (ref 8.6–10.2)
CHLORIDE SERPL-SCNC: 101 MMOL/L — SIGNIFICANT CHANGE UP (ref 98–107)
CO2 SERPL-SCNC: 25 MMOL/L — SIGNIFICANT CHANGE UP (ref 22–29)
CREAT SERPL-MCNC: 1.03 MG/DL — SIGNIFICANT CHANGE UP (ref 0.5–1.3)
EOSINOPHIL # BLD AUTO: 0.22 K/UL — SIGNIFICANT CHANGE UP (ref 0–0.5)
EOSINOPHIL NFR BLD AUTO: 3 % — SIGNIFICANT CHANGE UP (ref 0–6)
GLUCOSE SERPL-MCNC: 87 MG/DL — SIGNIFICANT CHANGE UP (ref 70–99)
HCT VFR BLD CALC: 46.6 % — SIGNIFICANT CHANGE UP (ref 39–50)
HGB BLD-MCNC: 16.2 G/DL — SIGNIFICANT CHANGE UP (ref 13–17)
IMM GRANULOCYTES NFR BLD AUTO: 0.5 % — SIGNIFICANT CHANGE UP (ref 0–1.5)
INR BLD: 0.92 RATIO — SIGNIFICANT CHANGE UP (ref 0.88–1.16)
LYMPHOCYTES # BLD AUTO: 1.61 K/UL — SIGNIFICANT CHANGE UP (ref 1–3.3)
LYMPHOCYTES # BLD AUTO: 21.6 % — SIGNIFICANT CHANGE UP (ref 13–44)
MCHC RBC-ENTMCNC: 32.6 PG — SIGNIFICANT CHANGE UP (ref 27–34)
MCHC RBC-ENTMCNC: 34.8 GM/DL — SIGNIFICANT CHANGE UP (ref 32–36)
MCV RBC AUTO: 93.8 FL — SIGNIFICANT CHANGE UP (ref 80–100)
MONOCYTES # BLD AUTO: 0.46 K/UL — SIGNIFICANT CHANGE UP (ref 0–0.9)
MONOCYTES NFR BLD AUTO: 6.2 % — SIGNIFICANT CHANGE UP (ref 2–14)
NEUTROPHILS # BLD AUTO: 5.07 K/UL — SIGNIFICANT CHANGE UP (ref 1.8–7.4)
NEUTROPHILS NFR BLD AUTO: 68.2 % — SIGNIFICANT CHANGE UP (ref 43–77)
PLATELET # BLD AUTO: 313 K/UL — SIGNIFICANT CHANGE UP (ref 150–400)
POTASSIUM SERPL-MCNC: 4.3 MMOL/L — SIGNIFICANT CHANGE UP (ref 3.5–5.3)
POTASSIUM SERPL-SCNC: 4.3 MMOL/L — SIGNIFICANT CHANGE UP (ref 3.5–5.3)
PROT SERPL-MCNC: 7.8 G/DL — SIGNIFICANT CHANGE UP (ref 6.6–8.7)
PROTHROM AB SERPL-ACNC: 10.7 SEC — SIGNIFICANT CHANGE UP (ref 10.6–13.6)
RBC # BLD: 4.97 M/UL — SIGNIFICANT CHANGE UP (ref 4.2–5.8)
RBC # FLD: 12.4 % — SIGNIFICANT CHANGE UP (ref 10.3–14.5)
SODIUM SERPL-SCNC: 137 MMOL/L — SIGNIFICANT CHANGE UP (ref 135–145)
WBC # BLD: 7.44 K/UL — SIGNIFICANT CHANGE UP (ref 3.8–10.5)
WBC # FLD AUTO: 7.44 K/UL — SIGNIFICANT CHANGE UP (ref 3.8–10.5)

## 2021-08-23 PROCEDURE — G0463: CPT

## 2021-08-23 PROCEDURE — 93010 ELECTROCARDIOGRAM REPORT: CPT

## 2021-08-23 PROCEDURE — 93005 ELECTROCARDIOGRAM TRACING: CPT

## 2021-08-23 NOTE — H&P PST ADULT - ASSESSMENT
55 yo M PMH of HTN, non-obstructive CAD, current everyday smoker (1 PPD x 34 years), zenker diverticulum, presents with c/o new onset difficulty swallowing solid foods for 3 weeks. His CXR was within normal limits, however his 21 CT Neck Soft Tissue showed a focus of air lying posterior to the upper esophagus, suspicious for the presence of an esophageal diverticulum. He was ordered for a CT Chest Scan that showed 1.4 x 1.8 cm contrast filled posterior upper esophageal diverticulum at the T1 level. Today he denies any chest pain, dizziness, palpitations, shortness of breath, fevers, chills, nausea, vomiting, diarrhea or other related symptoms. Preop assessment prior to upper endoscopy, zenkers diverticulectomy with myotomy w/Dr Truong on 9/10/21    Pt was educated on preop preparation with written and verbal instructions. Pt was informed to obtain clearances >3 days before surgery. Pt will review medications with PCP. Pt was educated on NSAIDs, multivitamins and herbals that increase the risk of bleeding and need to be stopped 7 days before procedure. Pt was educated on covid testing and covid prevention, i.e. social distancing, handwashing, mask wearing. Pt verbalized understanding of the above.     OPIOID RISK TOOL    EDER EACH BOX THAT APPLIES AND ADD TOTALS AT THE END    FAMILY HISTORY OF SUBSTANCE ABUSE                 FEMALE         MALE                                                Alcohol                             [  ]1 pt          [  ]3pts                                               Illegal Durgs                     [  ]2 pts        [  ]3pts                                               Rx Drugs                           [  ]4 pts        [  ]4 pts    PERSONAL HISTORY OF SUBSTANCE ABUSE                                                                                          Alcohol                             [  ]3 pts       [  ]3 pts                                               Illegal Drugs                     [  ]4 pts        [  ]4 pts                                               Rx Drugs                           [  ]5 pts        [  ]5 pts    AGE BETWEEN 16-45 YEARS                                      [  ]1 pt         [  ]1 pt    HISTORY OF PREADOLESCENT   SEXUAL ABUSE                                                             [  ]3 pts        [  ]0pts    PSYCHOLOGICAL DISEASE                     ADD, OCD, Bipolar, Schizophrenia        [  ]2 pts         [  ]2 pts                      Depression                                               [  ]1 pt           [  ]1 pt           SCORING TOTAL   (add numbers and type here)              ( 0 )                                     A score of 3 or lower indicated LOW risk for future opioid abuse  A score of 4 to 7 indicated moderate risk for future opioid abuse  A score of 8 or higher indicates a high risk for opioid abuse    CAPRINI VTE 2.0 SCORE [CLOT updated 2019]    AGE RELATED RISK FACTORS                                                       MOBILITY RELATED FACTORS  [x ] Age 41-60 years                                            (1 Point)                    [ ] Bed rest                                                        (1 Point)  [ ] Age: 61-74 years                                           (2 Points)                  [ ] Plaster cast                                                   (2 Points)  [ ] Age= 75 years                                              (3 Points)                    [ ] Bed bound for more than 72 hours                 (2 Points)    DISEASE RELATED RISK FACTORS                                               GENDER SPECIFIC FACTORS  [ ] Edema in the lower extremities                       (1 Point)              [ ] Pregnancy                                                     (1 Point)  [ ] Varicose veins                                               (1 Point)                     [ ] Post-partum < 6 weeks                                   (1 Point)             [ ] BMI > 25 Kg/m2                                            (1 Point)                     [ ] Hormonal therapy  or oral contraception          (1 Point)                 [ ] Sepsis (in the previous month)                        (1 Point)               [ ] History of pregnancy complications                 (1 point)  [ ] Pneumonia or serious lung disease                                               [ ] Unexplained or recurrent                     (1 Point)           (in the previous month)                               (1 Point)  [ ] Abnormal pulmonary function test                     (1 Point)                 SURGERY RELATED RISK FACTORS  [ ] Acute myocardial infarction                              (1 Point)               [ ]  Section                                             (1 Point)  [ ] Congestive heart failure (in the previous month)  (1 Point)      [ ] Minor surgery                                                  (1 Point)   [ ] Inflammatory bowel disease                             (1 Point)               [ ] Arthroscopic surgery                                        (2 Points)  [ ] Central venous access                                      (2 Points)                [ x] General surgery lasting more than 45 minutes (2 points)  [ ] Malignancy- Present or previous                   (2 Points)                [ ] Elective arthroplasty                                         (5 points)    [ ] Stroke (in the previous month)                          (5 Points)                                                                                                                                                           HEMATOLOGY RELATED FACTORS                                                 TRAUMA RELATED RISK FACTORS  [ ] Prior episodes of VTE                                     (3 Points)                [ ] Fracture of the hip, pelvis, or leg                       (5 Points)  [ ] Positive family history for VTE                         (3 Points)             [ ] Acute spinal cord injury (in the previous month)  (5 Points)  [ ] Prothrombin 14858 A                                     (3 Points)               [ ] Paralysis  (less than 1 month)                             (5 Points)  [ ] Factor V Leiden                                             (3 Points)                  [ ] Multiple Trauma within 1 month                        (5 Points)  [ ] Lupus anticoagulants                                     (3 Points)                                                           [ ] Anticardiolipin antibodies                               (3 Points)                                                       [ ] High homocysteine in the blood                      (3 Points)                                             [ ] Other congenital or acquired thrombophilia      (3 Points)                                                [ ] Heparin induced thrombocytopenia                  (3 Points)                                     Total Score [   3       ] 57 yo M PMH of HTN, non-obstructive CAD, current everyday smoker (1 PPD x 34 years), zenker diverticulum, presents with c/o new onset difficulty swallowing solid foods for about 2 months. Patient reports that "food gets stuck in my throat". His CXR was within normal limits, however 21 CT Neck Soft Tissue showed a focus of air lying posterior to the upper esophagus, suspicious for the presence of an esophageal diverticulum. He was ordered a CT Chest Scan that showed 1.4 x 1.8 cm contrast filled posterior upper esophageal diverticulum at the T1 level. Today he denies any chest pain, dizziness, palpitations, shortness of breath, fevers, chills, nausea, vomiting, diarrhea or other related symptoms. Preop assessment prior to upper endoscopy, zenkers diverticulectomy with myotomy w/Dr Truong on 9/10/21    Pt was educated on preop preparation with written and verbal instructions. Pt was informed to obtain clearances >3 days before surgery. Pt will review medications with PCP. Pt was educated on NSAIDs, multivitamins and herbals that increase the risk of bleeding and need to be stopped 7 days before procedure. Pt was educated on covid testing and covid prevention, i.e. social distancing, handwashing, mask wearing. Pt verbalized understanding of the above.     OPIOID RISK TOOL    EDER EACH BOX THAT APPLIES AND ADD TOTALS AT THE END    FAMILY HISTORY OF SUBSTANCE ABUSE                 FEMALE         MALE                                                Alcohol                             [  ]1 pt          [  ]3pts                                               Illegal Durgs                     [  ]2 pts        [  ]3pts                                               Rx Drugs                           [  ]4 pts        [  ]4 pts    PERSONAL HISTORY OF SUBSTANCE ABUSE                                                                                          Alcohol                             [  ]3 pts       [  ]3 pts                                               Illegal Drugs                     [  ]4 pts        [  ]4 pts                                               Rx Drugs                           [  ]5 pts        [  ]5 pts    AGE BETWEEN 16-45 YEARS                                      [  ]1 pt         [  ]1 pt    HISTORY OF PREADOLESCENT   SEXUAL ABUSE                                                             [  ]3 pts        [  ]0pts    PSYCHOLOGICAL DISEASE                     ADD, OCD, Bipolar, Schizophrenia        [  ]2 pts         [  ]2 pts                      Depression                                               [  ]1 pt           [  ]1 pt           SCORING TOTAL   (add numbers and type here)              ( 0 )                                     A score of 3 or lower indicated LOW risk for future opioid abuse  A score of 4 to 7 indicated moderate risk for future opioid abuse  A score of 8 or higher indicates a high risk for opioid abuse    CAPRINI VTE 2.0 SCORE [CLOT updated 2019]    AGE RELATED RISK FACTORS                                                       MOBILITY RELATED FACTORS  [x ] Age 41-60 years                                            (1 Point)                    [ ] Bed rest                                                        (1 Point)  [ ] Age: 61-74 years                                           (2 Points)                  [ ] Plaster cast                                                   (2 Points)  [ ] Age= 75 years                                              (3 Points)                    [ ] Bed bound for more than 72 hours                 (2 Points)    DISEASE RELATED RISK FACTORS                                               GENDER SPECIFIC FACTORS  [ ] Edema in the lower extremities                       (1 Point)              [ ] Pregnancy                                                     (1 Point)  [ ] Varicose veins                                               (1 Point)                     [ ] Post-partum < 6 weeks                                   (1 Point)             [ ] BMI > 25 Kg/m2                                            (1 Point)                     [ ] Hormonal therapy  or oral contraception          (1 Point)                 [ ] Sepsis (in the previous month)                        (1 Point)               [ ] History of pregnancy complications                 (1 point)  [ ] Pneumonia or serious lung disease                                               [ ] Unexplained or recurrent                     (1 Point)           (in the previous month)                               (1 Point)  [ ] Abnormal pulmonary function test                     (1 Point)                 SURGERY RELATED RISK FACTORS  [ ] Acute myocardial infarction                              (1 Point)               [ ]  Section                                             (1 Point)  [ ] Congestive heart failure (in the previous month)  (1 Point)      [ ] Minor surgery                                                  (1 Point)   [ ] Inflammatory bowel disease                             (1 Point)               [ ] Arthroscopic surgery                                        (2 Points)  [ ] Central venous access                                      (2 Points)                [ x] General surgery lasting more than 45 minutes (2 points)  [ ] Malignancy- Present or previous                   (2 Points)                [ ] Elective arthroplasty                                         (5 points)    [ ] Stroke (in the previous month)                          (5 Points)                                                                                                                                                           HEMATOLOGY RELATED FACTORS                                                 TRAUMA RELATED RISK FACTORS  [ ] Prior episodes of VTE                                     (3 Points)                [ ] Fracture of the hip, pelvis, or leg                       (5 Points)  [ ] Positive family history for VTE                         (3 Points)             [ ] Acute spinal cord injury (in the previous month)  (5 Points)  [ ] Prothrombin 87201 A                                     (3 Points)               [ ] Paralysis  (less than 1 month)                             (5 Points)  [ ] Factor V Leiden                                             (3 Points)                  [ ] Multiple Trauma within 1 month                        (5 Points)  [ ] Lupus anticoagulants                                     (3 Points)                                                           [ ] Anticardiolipin antibodies                               (3 Points)                                                       [ ] High homocysteine in the blood                      (3 Points)                                             [ ] Other congenital or acquired thrombophilia      (3 Points)                                                [ ] Heparin induced thrombocytopenia                  (3 Points)                                     Total Score [   3       ] 57 yo M PMH of HTN, non-obstructive CAD, current everyday smoker (1 PPD x 34 years), zenker diverticulum, presents with c/o new onset difficulty swallowing solid foods for about 2 months. Patient reports that "food gets stuck in my throat". His CXR was within normal limits, however 21 CT Neck Soft Tissue showed a focus of air lying posterior to the upper esophagus, suspicious for the presence of an esophageal diverticulum. He was ordered a CT Chest Scan that showed 1.4 x 1.8 cm contrast filled posterior upper esophageal diverticulum at the T1 level. Today he denies any chest pain, dizziness, palpitations, shortness of breath, fevers, chills, nausea, vomiting, diarrhea or other related symptoms. Preop assessment prior to upper endoscopy, zenkers diverticulectomy with myotomy w/Dr Truong on 9/10/21    Pt was educated on preop preparation with written and verbal instructions. Pt was informed to obtain clearance >3 days before surgery. Pt will review medications with PCP. Pt was instructed to hold aspirin 5 days prior to surgery as per Dr Truong. Pt was educated on NSAIDs, multivitamins and herbals that increase the risk of bleeding and need to be stopped 7 days before procedure. Pt was educated on covid testing and covid prevention, i.e. social distancing, handwashing, mask wearing. Pt verbalized understanding of the above.     OPIOID RISK TOOL    EDER EACH BOX THAT APPLIES AND ADD TOTALS AT THE END    FAMILY HISTORY OF SUBSTANCE ABUSE                 FEMALE         MALE                                                Alcohol                             [  ]1 pt          [  ]3pts                                               Illegal Durgs                     [  ]2 pts        [  ]3pts                                               Rx Drugs                           [  ]4 pts        [  ]4 pts    PERSONAL HISTORY OF SUBSTANCE ABUSE                                                                                          Alcohol                             [  ]3 pts       [  ]3 pts                                               Illegal Drugs                     [  ]4 pts        [  ]4 pts                                               Rx Drugs                           [  ]5 pts        [  ]5 pts    AGE BETWEEN 16-45 YEARS                                      [  ]1 pt         [  ]1 pt    HISTORY OF PREADOLESCENT   SEXUAL ABUSE                                                             [  ]3 pts        [  ]0pts    PSYCHOLOGICAL DISEASE                     ADD, OCD, Bipolar, Schizophrenia        [  ]2 pts         [  ]2 pts                      Depression                                               [  ]1 pt           [  ]1 pt           SCORING TOTAL   (add numbers and type here)              ( 0 )                                     A score of 3 or lower indicated LOW risk for future opioid abuse  A score of 4 to 7 indicated moderate risk for future opioid abuse  A score of 8 or higher indicates a high risk for opioid abuse    CAPRINI VTE 2.0 SCORE [CLOT updated 2019]    AGE RELATED RISK FACTORS                                                       MOBILITY RELATED FACTORS  [x ] Age 41-60 years                                            (1 Point)                    [ ] Bed rest                                                        (1 Point)  [ ] Age: 61-74 years                                           (2 Points)                  [ ] Plaster cast                                                   (2 Points)  [ ] Age= 75 years                                              (3 Points)                    [ ] Bed bound for more than 72 hours                 (2 Points)    DISEASE RELATED RISK FACTORS                                               GENDER SPECIFIC FACTORS  [ ] Edema in the lower extremities                       (1 Point)              [ ] Pregnancy                                                     (1 Point)  [ ] Varicose veins                                               (1 Point)                     [ ] Post-partum < 6 weeks                                   (1 Point)             [ ] BMI > 25 Kg/m2                                            (1 Point)                     [ ] Hormonal therapy  or oral contraception          (1 Point)                 [ ] Sepsis (in the previous month)                        (1 Point)               [ ] History of pregnancy complications                 (1 point)  [ ] Pneumonia or serious lung disease                                               [ ] Unexplained or recurrent                     (1 Point)           (in the previous month)                               (1 Point)  [ ] Abnormal pulmonary function test                     (1 Point)                 SURGERY RELATED RISK FACTORS  [ ] Acute myocardial infarction                              (1 Point)               [ ]  Section                                             (1 Point)  [ ] Congestive heart failure (in the previous month)  (1 Point)      [ ] Minor surgery                                                  (1 Point)   [ ] Inflammatory bowel disease                             (1 Point)               [ ] Arthroscopic surgery                                        (2 Points)  [ ] Central venous access                                      (2 Points)                [ x] General surgery lasting more than 45 minutes (2 points)  [ ] Malignancy- Present or previous                   (2 Points)                [ ] Elective arthroplasty                                         (5 points)    [ ] Stroke (in the previous month)                          (5 Points)                                                                                                                                                           HEMATOLOGY RELATED FACTORS                                                 TRAUMA RELATED RISK FACTORS  [ ] Prior episodes of VTE                                     (3 Points)                [ ] Fracture of the hip, pelvis, or leg                       (5 Points)  [ ] Positive family history for VTE                         (3 Points)             [ ] Acute spinal cord injury (in the previous month)  (5 Points)  [ ] Prothrombin 50221 A                                     (3 Points)               [ ] Paralysis  (less than 1 month)                             (5 Points)  [ ] Factor V Leiden                                             (3 Points)                  [ ] Multiple Trauma within 1 month                        (5 Points)  [ ] Lupus anticoagulants                                     (3 Points)                                                           [ ] Anticardiolipin antibodies                               (3 Points)                                                       [ ] High homocysteine in the blood                      (3 Points)                                             [ ] Other congenital or acquired thrombophilia      (3 Points)                                                [ ] Heparin induced thrombocytopenia                  (3 Points)                                     Total Score [   3       ]

## 2021-08-23 NOTE — H&P PST ADULT - NSICDXFAMHXNEG_GEN_ALL
asthma/atrial fibrillation/cancer/congestive heart failure/COPD/diabetes/heart disease/hypertension/stroke/thyroid disease/VTE cancer/COPD/diabetes/heart disease/stroke/thyroid disease/VTE

## 2021-08-23 NOTE — H&P PST ADULT - PROBLEM SELECTOR PLAN 1
caprini score 3, moderate risk for dvt, SCD ordered, surgical team to assess for dvt prophylaxis preop assessment, medical clearance pending, upper endoscopy, zenkers diverticulectomy with myotomy on 9/10/21

## 2021-08-23 NOTE — H&P PST ADULT - NSICDXPASTMEDICALHX_GEN_ALL_CORE_FT
PAST MEDICAL HISTORY:  Current smoker     Diverticulum of esophagus, acquired     HTN (hypertension)      PAST MEDICAL HISTORY:  CAD (coronary artery disease)     Current smoker     Diverticulum of esophagus, acquired     GERD (gastroesophageal reflux disease)     HTN (hypertension)     Hyperlipidemia

## 2021-08-23 NOTE — H&P PST ADULT - HISTORY OF PRESENT ILLNESS
55 yo M PMH of HTN, non-obstructive CAD, current everyday smoker (1 PPD x 34 years), zenker diverticulum, presents with c/o new onset difficulty swallowing solid foods for 3 weeks. His CXR was within normal limits, however his 21 CT Neck Soft Tissue showed a focus of air lying posterior to the upper esophagus, suspicious for the presence of an esophageal diverticulum. He was ordered for a CT Chest Scan that showed 1.4 x 1.8 cm contrast filled posterior upper esophageal diverticulum at the T1 level. Today he denies any chest pain, dizziness, palpitations, shortness of breath, fevers, chills, nausea, vomiting, diarrhea or other related symptoms. Preop assessment prior to upper endoscopy, zenkers diverticulectomy with myotomy w/Dr Truong on 9/10/21    Completed COVID 19 vaccination: Pfizer x2 (21)      Imagin21 CT CHEST from [facility]  IMPRESSION:  Contrast-filled posterior upper esophageal diverticulum as described. Consider esophagram and/or endoscopic correlation  Emphysematous changes as described    21 CXR from [facility]: No acute process.    21 NECK SOFT TISSUE From [facility]   No acute radiographic findings    21 EXAM: CT NECK SOFT TISSUE from [facility]   -No radiopaque foreign body seen along the aerodigestive tract.  -Focus of air lying posterior to the upper esophagus suspicious for the presence of an esophageal diverticulum. Clinical correlation is recommended. 57 yo M PMH of HTN, non-obstructive CAD, current everyday smoker (1 PPD x 34 years), zenker diverticulum, presents with c/o new onset difficulty swallowing solid foods for about 2 months. Patient reports that "food gets stuck in my throat". His CXR was within normal limits, however 21 CT Neck Soft Tissue showed a focus of air lying posterior to the upper esophagus, suspicious for the presence of an esophageal diverticulum. He was ordered a CT Chest Scan that showed 1.4 x 1.8 cm contrast filled posterior upper esophageal diverticulum at the T1 level. Today he denies any chest pain, dizziness, palpitations, shortness of breath, fevers, chills, nausea, vomiting, diarrhea or other related symptoms. Preop assessment prior to upper endoscopy, zenkers diverticulectomy with myotomy w/Dr Truong on 9/10/21    Completed COVID 19 vaccination: Pfizer x2 (21)      Imagin21 CT CHEST from [facility]  IMPRESSION:  Contrast-filled posterior upper esophageal diverticulum as described. Consider esophagram and/or endoscopic correlation  Emphysematous changes as described    21 CXR from [facility]: No acute process.    21 NECK SOFT TISSUE From [facility]   No acute radiographic findings    21 EXAM: CT NECK SOFT TISSUE from [facility]   -No radiopaque foreign body seen along the aerodigestive tract.  -Focus of air lying posterior to the upper esophagus suspicious for the presence of an esophageal diverticulum. Clinical correlation is recommended. 55 yo M PMH of HTN, non-obstructive CAD, current everyday smoker (1 PPD x 34 years), zenker diverticulum, presents with c/o new onset difficulty swallowing solid foods for about 2 months. Patient reports that "food gets stuck in my throat". His CXR was within normal limits, however 21 CT Neck Soft Tissue showed a focus of air lying posterior to the upper esophagus, suspicious for the presence of an esophageal diverticulum. He was ordered a CT Chest Scan that showed 1.4 x 1.8 cm contrast filled posterior upper esophageal diverticulum at the T1 level. Today he denies any chest pain, dizziness, palpitations, shortness of breath, fevers, chills, nausea, vomiting, diarrhea or other related symptoms. Preop assessment prior to upper endoscopy, zenkers diverticulectomy with myotomy w/Dr Truong on 9/10/21    Completed COVID 19 vaccination: Pfizer x2 (21)    Imagin21 CT CHEST from [facility]: Contrast-filled posterior upper esophageal diverticulum  21 CXR from [facility]: No acute process  21 CT NECK SOFT TISSUE from [facility]   -No radiopaque foreign body seen along the aerodigestive tract.  -Focus of air lying posterior to the upper esophagus suspicious for the presence of an esophageal diverticulum

## 2021-08-23 NOTE — H&P PST ADULT - PROBLEM SELECTOR PLAN 2
preop assessment, medical clearance pending, upper endoscopy, zenkers diverticulectomy with myotomy on 9/10/21 caprini score 3, moderate risk for dvt, SCD ordered, surgical team to assess for dvt prophylaxis

## 2021-08-30 PROBLEM — I25.10 ATHEROSCLEROTIC HEART DISEASE OF NATIVE CORONARY ARTERY WITHOUT ANGINA PECTORIS: Chronic | Status: ACTIVE | Noted: 2021-08-23

## 2021-08-30 PROBLEM — K22.5 DIVERTICULUM OF ESOPHAGUS, ACQUIRED: Chronic | Status: ACTIVE | Noted: 2021-08-23

## 2021-08-30 PROBLEM — F17.200 NICOTINE DEPENDENCE, UNSPECIFIED, UNCOMPLICATED: Chronic | Status: ACTIVE | Noted: 2021-08-23

## 2021-08-30 PROBLEM — E78.5 HYPERLIPIDEMIA, UNSPECIFIED: Chronic | Status: ACTIVE | Noted: 2021-08-23

## 2021-08-30 PROBLEM — K21.9 GASTRO-ESOPHAGEAL REFLUX DISEASE WITHOUT ESOPHAGITIS: Chronic | Status: ACTIVE | Noted: 2021-08-23

## 2021-09-01 ENCOUNTER — OUTPATIENT (OUTPATIENT)
Dept: OUTPATIENT SERVICES | Facility: HOSPITAL | Age: 56
LOS: 1 days | End: 2021-09-01
Payer: MEDICAID

## 2021-09-01 DIAGNOSIS — Z98.890 OTHER SPECIFIED POSTPROCEDURAL STATES: Chronic | ICD-10-CM

## 2021-09-03 DIAGNOSIS — Z01.818 ENCOUNTER FOR OTHER PREPROCEDURAL EXAMINATION: ICD-10-CM

## 2021-09-07 ENCOUNTER — APPOINTMENT (OUTPATIENT)
Dept: DISASTER EMERGENCY | Facility: CLINIC | Age: 56
End: 2021-09-07

## 2021-09-08 LAB — SARS-COV-2 N GENE NPH QL NAA+PROBE: NOT DETECTED

## 2021-09-13 ENCOUNTER — OUTPATIENT (OUTPATIENT)
Dept: OUTPATIENT SERVICES | Facility: HOSPITAL | Age: 56
LOS: 1 days | End: 2021-09-13
Payer: COMMERCIAL

## 2021-09-13 DIAGNOSIS — R91.1 SOLITARY PULMONARY NODULE: ICD-10-CM

## 2021-09-13 DIAGNOSIS — Z98.890 OTHER SPECIFIED POSTPROCEDURAL STATES: Chronic | ICD-10-CM

## 2021-09-13 PROCEDURE — 74220 X-RAY XM ESOPHAGUS 1CNTRST: CPT | Mod: 26

## 2021-09-13 PROCEDURE — 74220 X-RAY XM ESOPHAGUS 1CNTRST: CPT

## 2021-09-13 RX ORDER — CEFAZOLIN SODIUM 1 G
2000 VIAL (EA) INJECTION ONCE
Refills: 0 | Status: COMPLETED | OUTPATIENT
Start: 2021-09-17 | End: 2021-09-17

## 2021-09-14 ENCOUNTER — APPOINTMENT (OUTPATIENT)
Dept: DISASTER EMERGENCY | Facility: CLINIC | Age: 56
End: 2021-09-14

## 2021-09-15 LAB — SARS-COV-2 N GENE NPH QL NAA+PROBE: NOT DETECTED

## 2021-09-16 ENCOUNTER — TRANSCRIPTION ENCOUNTER (OUTPATIENT)
Age: 56
End: 2021-09-16

## 2021-09-17 ENCOUNTER — APPOINTMENT (OUTPATIENT)
Dept: THORACIC SURGERY | Facility: HOSPITAL | Age: 56
End: 2021-09-17

## 2021-09-17 ENCOUNTER — INPATIENT (INPATIENT)
Facility: HOSPITAL | Age: 56
LOS: 0 days | Discharge: HOME | DRG: 328 | End: 2021-09-18
Attending: RADIOLOGY | Admitting: RADIOLOGY
Payer: COMMERCIAL

## 2021-09-17 ENCOUNTER — RESULT REVIEW (OUTPATIENT)
Age: 56
End: 2021-09-17

## 2021-09-17 VITALS
HEIGHT: 68 IN | HEART RATE: 84 BPM | DIASTOLIC BLOOD PRESSURE: 91 MMHG | TEMPERATURE: 98 F | WEIGHT: 149.91 LBS | SYSTOLIC BLOOD PRESSURE: 144 MMHG | OXYGEN SATURATION: 99 % | RESPIRATION RATE: 16 BRPM

## 2021-09-17 DIAGNOSIS — Z98.890 OTHER SPECIFIED POSTPROCEDURAL STATES: Chronic | ICD-10-CM

## 2021-09-17 DIAGNOSIS — K22.5 DIVERTICULUM OF ESOPHAGUS, ACQUIRED: ICD-10-CM

## 2021-09-17 LAB — BLD GP AB SCN SERPL QL: SIGNIFICANT CHANGE UP

## 2021-09-17 PROCEDURE — 43135 REMOVAL OF ESOPHAGUS POUCH: CPT | Mod: 80

## 2021-09-17 PROCEDURE — 88307 TISSUE EXAM BY PATHOLOGIST: CPT | Mod: 26

## 2021-09-17 RX ORDER — ATORVASTATIN CALCIUM 80 MG/1
40 TABLET, FILM COATED ORAL AT BEDTIME
Refills: 0 | Status: DISCONTINUED | OUTPATIENT
Start: 2021-09-17 | End: 2021-09-18

## 2021-09-17 RX ORDER — ACETAMINOPHEN 500 MG
975 TABLET ORAL ONCE
Refills: 0 | Status: COMPLETED | OUTPATIENT
Start: 2021-09-17 | End: 2021-09-17

## 2021-09-17 RX ORDER — ONDANSETRON 8 MG/1
4 TABLET, FILM COATED ORAL EVERY 4 HOURS
Refills: 0 | Status: DISCONTINUED | OUTPATIENT
Start: 2021-09-17 | End: 2021-09-17

## 2021-09-17 RX ORDER — ACETAMINOPHEN 500 MG
1000 TABLET ORAL ONCE
Refills: 0 | Status: COMPLETED | OUTPATIENT
Start: 2021-09-17 | End: 2021-09-17

## 2021-09-17 RX ORDER — ALBUTEROL 90 UG/1
2 AEROSOL, METERED ORAL EVERY 6 HOURS
Refills: 0 | Status: DISCONTINUED | OUTPATIENT
Start: 2021-09-17 | End: 2021-09-18

## 2021-09-17 RX ORDER — SODIUM CHLORIDE 9 MG/ML
3 INJECTION INTRAMUSCULAR; INTRAVENOUS; SUBCUTANEOUS ONCE
Refills: 0 | Status: DISCONTINUED | OUTPATIENT
Start: 2021-09-17 | End: 2021-09-17

## 2021-09-17 RX ORDER — BENZOCAINE AND MENTHOL 5; 1 G/100ML; G/100ML
1 LIQUID ORAL ONCE
Refills: 0 | Status: COMPLETED | OUTPATIENT
Start: 2021-09-17 | End: 2021-09-17

## 2021-09-17 RX ORDER — ENOXAPARIN SODIUM 100 MG/ML
40 INJECTION SUBCUTANEOUS DAILY
Refills: 0 | Status: DISCONTINUED | OUTPATIENT
Start: 2021-09-18 | End: 2021-09-18

## 2021-09-17 RX ORDER — ALBUTEROL 90 UG/1
2.5 AEROSOL, METERED ORAL EVERY 6 HOURS
Refills: 0 | Status: DISCONTINUED | OUTPATIENT
Start: 2021-09-17 | End: 2021-09-18

## 2021-09-17 RX ORDER — SENNA PLUS 8.6 MG/1
2 TABLET ORAL AT BEDTIME
Refills: 0 | Status: DISCONTINUED | OUTPATIENT
Start: 2021-09-17 | End: 2021-09-18

## 2021-09-17 RX ORDER — ASPIRIN/CALCIUM CARB/MAGNESIUM 324 MG
81 TABLET ORAL DAILY
Refills: 0 | Status: DISCONTINUED | OUTPATIENT
Start: 2021-09-18 | End: 2021-09-18

## 2021-09-17 RX ORDER — BUPIVACAINE 13.3 MG/ML
20 INJECTION, SUSPENSION, LIPOSOMAL INFILTRATION ONCE
Refills: 0 | Status: DISCONTINUED | OUTPATIENT
Start: 2021-09-17 | End: 2021-09-17

## 2021-09-17 RX ORDER — FENTANYL CITRATE 50 UG/ML
50 INJECTION INTRAVENOUS
Refills: 0 | Status: DISCONTINUED | OUTPATIENT
Start: 2021-09-17 | End: 2021-09-17

## 2021-09-17 RX ORDER — METOPROLOL TARTRATE 50 MG
25 TABLET ORAL
Refills: 0 | Status: DISCONTINUED | OUTPATIENT
Start: 2021-09-17 | End: 2021-09-18

## 2021-09-17 RX ADMIN — FENTANYL CITRATE 50 MICROGRAM(S): 50 INJECTION INTRAVENOUS at 16:37

## 2021-09-17 RX ADMIN — FENTANYL CITRATE 50 MICROGRAM(S): 50 INJECTION INTRAVENOUS at 16:50

## 2021-09-17 RX ADMIN — ATORVASTATIN CALCIUM 40 MILLIGRAM(S): 80 TABLET, FILM COATED ORAL at 22:24

## 2021-09-17 RX ADMIN — Medication 100 MILLIGRAM(S): at 14:30

## 2021-09-17 RX ADMIN — FENTANYL CITRATE 50 MICROGRAM(S): 50 INJECTION INTRAVENOUS at 17:15

## 2021-09-17 RX ADMIN — Medication 25 MILLIGRAM(S): at 22:21

## 2021-09-17 RX ADMIN — Medication 400 MILLIGRAM(S): at 22:24

## 2021-09-17 NOTE — BRIEF OPERATIVE NOTE - COMMENTS
Patient tx to PACU    Gtts: none   Blood products given: none  Extubated     report given to thoracic midlevel

## 2021-09-17 NOTE — BRIEF OPERATIVE NOTE - NSICDXBRIEFPROCEDURE_GEN_ALL_CORE_FT
PROCEDURES:  Zenker's diverticulectomy 17-Sep-2021 16:47:58 w/ removal diverticullum  w/ EGD Brigette Anderson

## 2021-09-17 NOTE — ASU PREOP CHECKLIST - IV STARTED
December 10, 2018      Farrukh Jensen MD  [address]  [address]  [place] [address]           [facility]  [address], [address]  [address]  [place] [address]  Phone: [phone]          Patient: Lauren Rain   MR Number: [id number]   YOB: 1974   Date of Visit: 12/10/2018       Dear Dr. Farrukh Jensen:    Thank you for referring Lauren Rain to me for evaluation. Attached you will find relevant portions of my assessment and plan of care.    If you have questions, please do not hesitate to call me. I look forward to following Lauren Rain along with you.    Sincerely,    Andie Crow MD    Enclosure  CC:  No Recipients    If you would like to receive this communication electronically, please contact [email or web address] or [phone] to request more information on [facility] access.    For providers and/or their staff who would like to refer a patient to [facility], please contact us through our one-stop-shop provider referral line, [facility], at [phone].    If you feel you have received this communication in error or would no longer like to receive these types of communications, please e-mail [email or web address]         
yes

## 2021-09-18 ENCOUNTER — TRANSCRIPTION ENCOUNTER (OUTPATIENT)
Age: 56
End: 2021-09-18

## 2021-09-18 VITALS
OXYGEN SATURATION: 95 % | SYSTOLIC BLOOD PRESSURE: 154 MMHG | TEMPERATURE: 98 F | HEART RATE: 68 BPM | RESPIRATION RATE: 18 BRPM | DIASTOLIC BLOOD PRESSURE: 90 MMHG

## 2021-09-18 LAB
ANION GAP SERPL CALC-SCNC: 17 MMOL/L — SIGNIFICANT CHANGE UP (ref 5–17)
BUN SERPL-MCNC: 8.5 MG/DL — SIGNIFICANT CHANGE UP (ref 8–20)
CALCIUM SERPL-MCNC: 8.9 MG/DL — SIGNIFICANT CHANGE UP (ref 8.6–10.2)
CHLORIDE SERPL-SCNC: 99 MMOL/L — SIGNIFICANT CHANGE UP (ref 98–107)
CO2 SERPL-SCNC: 23 MMOL/L — SIGNIFICANT CHANGE UP (ref 22–29)
CREAT SERPL-MCNC: 1 MG/DL — SIGNIFICANT CHANGE UP (ref 0.5–1.3)
GLUCOSE SERPL-MCNC: 98 MG/DL — SIGNIFICANT CHANGE UP (ref 70–99)
HCT VFR BLD CALC: 44.9 % — SIGNIFICANT CHANGE UP (ref 39–50)
HGB BLD-MCNC: 15.2 G/DL — SIGNIFICANT CHANGE UP (ref 13–17)
MAGNESIUM SERPL-MCNC: 2 MG/DL — SIGNIFICANT CHANGE UP (ref 1.6–2.6)
MCHC RBC-ENTMCNC: 32.6 PG — SIGNIFICANT CHANGE UP (ref 27–34)
MCHC RBC-ENTMCNC: 33.9 GM/DL — SIGNIFICANT CHANGE UP (ref 32–36)
MCV RBC AUTO: 96.4 FL — SIGNIFICANT CHANGE UP (ref 80–100)
PHOSPHATE SERPL-MCNC: 3.5 MG/DL — SIGNIFICANT CHANGE UP (ref 2.4–4.7)
PLATELET # BLD AUTO: 295 K/UL — SIGNIFICANT CHANGE UP (ref 150–400)
POTASSIUM SERPL-MCNC: 4.2 MMOL/L — SIGNIFICANT CHANGE UP (ref 3.5–5.3)
POTASSIUM SERPL-SCNC: 4.2 MMOL/L — SIGNIFICANT CHANGE UP (ref 3.5–5.3)
RBC # BLD: 4.66 M/UL — SIGNIFICANT CHANGE UP (ref 4.2–5.8)
RBC # FLD: 12 % — SIGNIFICANT CHANGE UP (ref 10.3–14.5)
SODIUM SERPL-SCNC: 139 MMOL/L — SIGNIFICANT CHANGE UP (ref 135–145)
WBC # BLD: 11.13 K/UL — HIGH (ref 3.8–10.5)
WBC # FLD AUTO: 11.13 K/UL — HIGH (ref 3.8–10.5)

## 2021-09-18 PROCEDURE — 86850 RBC ANTIBODY SCREEN: CPT

## 2021-09-18 PROCEDURE — C1889: CPT

## 2021-09-18 PROCEDURE — 80048 BASIC METABOLIC PNL TOTAL CA: CPT

## 2021-09-18 PROCEDURE — 85027 COMPLETE CBC AUTOMATED: CPT

## 2021-09-18 PROCEDURE — C9399: CPT

## 2021-09-18 PROCEDURE — 71250 CT THORAX DX C-: CPT

## 2021-09-18 PROCEDURE — 88307 TISSUE EXAM BY PATHOLOGIST: CPT

## 2021-09-18 PROCEDURE — 84100 ASSAY OF PHOSPHORUS: CPT

## 2021-09-18 PROCEDURE — 86900 BLOOD TYPING SEROLOGIC ABO: CPT

## 2021-09-18 PROCEDURE — 94640 AIRWAY INHALATION TREATMENT: CPT

## 2021-09-18 PROCEDURE — 86901 BLOOD TYPING SEROLOGIC RH(D): CPT

## 2021-09-18 PROCEDURE — 36415 COLL VENOUS BLD VENIPUNCTURE: CPT

## 2021-09-18 PROCEDURE — 71250 CT THORAX DX C-: CPT | Mod: 26

## 2021-09-18 PROCEDURE — 83735 ASSAY OF MAGNESIUM: CPT

## 2021-09-18 RX ORDER — ASPIRIN/CALCIUM CARB/MAGNESIUM 324 MG
1 TABLET ORAL
Qty: 0 | Refills: 0 | DISCHARGE
Start: 2021-09-18

## 2021-09-18 RX ORDER — ALPRAZOLAM 0.25 MG
0.25 TABLET ORAL EVERY 8 HOURS
Refills: 0 | Status: DISCONTINUED | OUTPATIENT
Start: 2021-09-18 | End: 2021-09-18

## 2021-09-18 RX ORDER — ASPIRIN/CALCIUM CARB/MAGNESIUM 324 MG
1 TABLET ORAL
Qty: 0 | Refills: 0 | DISCHARGE

## 2021-09-18 RX ORDER — HYDROMORPHONE HYDROCHLORIDE 2 MG/ML
0.5 INJECTION INTRAMUSCULAR; INTRAVENOUS; SUBCUTANEOUS
Refills: 0 | Status: DISCONTINUED | OUTPATIENT
Start: 2021-09-18 | End: 2021-09-18

## 2021-09-18 RX ORDER — ACETAMINOPHEN 500 MG
2 TABLET ORAL
Qty: 0 | Refills: 0 | DISCHARGE

## 2021-09-18 RX ORDER — METOPROLOL TARTRATE 50 MG
1 TABLET ORAL
Qty: 0 | Refills: 0 | DISCHARGE
Start: 2021-09-18

## 2021-09-18 RX ORDER — NICOTINE POLACRILEX 2 MG
1 GUM BUCCAL DAILY
Refills: 0 | Status: DISCONTINUED | OUTPATIENT
Start: 2021-09-18 | End: 2021-09-18

## 2021-09-18 RX ORDER — ACETAMINOPHEN 500 MG
1000 TABLET ORAL ONCE
Refills: 0 | Status: COMPLETED | OUTPATIENT
Start: 2021-09-18 | End: 2021-09-18

## 2021-09-18 RX ADMIN — BENZOCAINE AND MENTHOL 1 LOZENGE: 5; 1 LIQUID ORAL at 05:41

## 2021-09-18 RX ADMIN — HYDROMORPHONE HYDROCHLORIDE 0.5 MILLIGRAM(S): 2 INJECTION INTRAMUSCULAR; INTRAVENOUS; SUBCUTANEOUS at 08:27

## 2021-09-18 RX ADMIN — HYDROMORPHONE HYDROCHLORIDE 0.5 MILLIGRAM(S): 2 INJECTION INTRAMUSCULAR; INTRAVENOUS; SUBCUTANEOUS at 08:48

## 2021-09-18 RX ADMIN — ALBUTEROL 2 PUFF(S): 90 AEROSOL, METERED ORAL at 03:41

## 2021-09-18 RX ADMIN — Medication 25 MILLIGRAM(S): at 05:39

## 2021-09-18 RX ADMIN — Medication 400 MILLIGRAM(S): at 12:25

## 2021-09-18 RX ADMIN — Medication 81 MILLIGRAM(S): at 11:32

## 2021-09-18 RX ADMIN — Medication 1000 MILLIGRAM(S): at 13:03

## 2021-09-18 RX ADMIN — Medication 25 MILLIGRAM(S): at 17:51

## 2021-09-18 NOTE — DISCHARGE NOTE PROVIDER - PROVIDER TOKENS
FREE:[LAST:[yobani],FIRST:[ivory],PHONE:[phone]],FAX:[(   )    -],ADDRESS:[[address]]]

## 2021-09-18 NOTE — DISCHARGE NOTE PROVIDER - NSDCMRMEDTOKEN_GEN_ALL_CORE_FT
aspirin 81 mg oral delayed release tablet: 1 tab(s) orally once a day  atorvastatin 40 mg oral tablet: 1 tab(s) orally once a day (at bedtime)  metoprolol tartrate 25 mg oral tablet: 1 tab(s) orally 2 times a day   aspirin 81 mg oral delayed release tablet: 1 tab(s) orally once a day  atorvastatin 40 mg oral tablet: 1 tab(s) orally once a day (at bedtime)  metoprolol tartrate 25 mg oral tablet: 1 tab(s) orally 2 times a day  Tylenol 325 mg oral tablet: 2 tab(s) orally every 4 hours

## 2021-09-18 NOTE — DISCHARGE NOTE NURSING/CASE MANAGEMENT/SOCIAL WORK - PATIENT PORTAL LINK FT
You can access the FollowMyHealth Patient Portal offered by [facility] by registering at the following website: [email or web address]. By joining [facility]’s FollowMyHealth portal, you will also be able to view your health information using other applications (apps) compatible with our system.

## 2021-09-18 NOTE — DISCHARGE NOTE PROVIDER - NSDCCPCAREPLAN_GEN_ALL_CORE_FT
PRINCIPAL DISCHARGE DIAGNOSIS  Diagnosis: Zenkers diverticulum  Assessment and Plan of Treatment:

## 2021-09-18 NOTE — DISCHARGE NOTE NURSING/CASE MANAGEMENT/SOCIAL WORK - NSDCPEFALRISK_GEN_ALL_CORE
For information on Fall & injury Prevention, visit [email or web address]

## 2021-09-18 NOTE — DISCHARGE NOTE PROVIDER - CARE PROVIDER_API CALL
ivory hilton  [address]  Phone: [phone]  Fax: (   )    -  Follow Up Time:

## 2021-09-18 NOTE — PROGRESS NOTE ADULT - SUBJECTIVE AND OBJECTIVE BOX
Subjective:  Pt in bed NAD no issues overnight     T(C): 36.8 (09-18-21 @ 07:48), Max: 36.8 (09-18-21 @ 07:48)  HR: 71 (09-18-21 @ 11:48) (63 - 81)  BP: 155/89 (09-18-21 @ 11:48) (129/98 - 155/89)  ABP: --  ABP(mean): --  RR: 18 (09-18-21 @ 07:48) (12 - 18)  SpO2: 98% (09-18-21 @ 07:48) (95% - 100%) RA   Wt(kg): --  CVP(mm Hg): --  CO: --  CI: --  PA: --                                              Tele: SR     KACIE: 10 cc         per 24 hours    AIR LEAKS:  [ ] YES [x ] NO        09-18    139  |  99  |  8.5  ----------------------------<  98  4.2   |  23.0  |  1.00    Ca    8.9      18 Sep 2021 07:44  Phos  3.5     09-18  Mg     2.0     09-18                                 15.2   11.13 )-----------( 295      ( 18 Sep 2021 07:44 )             44.9                 CAPILLARY BLOOD GLUCOSE               CXR: < from: CT Chest w/ Oral Cont (07.13.21 @ 18:53) >  PROCEDURE:  CT of the Chest was performed.  Sagittal and coronal reformats were performed.    FINDINGS:    LUNGS AND AIRWAYS: Patent central airways.  Lungs are hyperinflated with blebs and bullae in the upper lobes. No focal consolidation.  PLEURA: No pleural effusion.  MEDIASTINUM AND BECKI: 1.4 x 1.8 cm contrast filled posterior upper esophageal diverticulum at the T1 level.  consider correlation with esophagram and/or endoscopy.  VESSELS: Nonaneurysmal  HEART: Heart size is normal. Coronary artery calcification. No pericardial effusion.  CHEST WALL AND LOWER NECK: Within normal limits.  VISUALIZED UPPER ABDOMEN: Within normal limits.  BONES: No acute finding.    IMPRESSION:  Contrast-filled posterior upper esophageal diverticulum as described. Consider esophagram and/or endoscopic correlation  Emphysematous changes as described    < end of copied text >          Exam  Neuro:  Alert Awake NAD  Neck: dressing C/D/I  + KACIE drain   Pulm: decreased at bases b/l   CV: RRR S1 S2   Abd: soft NT ND + BS        Assessment:  56yMale    with PAST MEDICAL & SURGICAL HISTORY:  HTN (hypertension)    Current smoker    Diverticulum of esophagus, acquired    CAD (coronary artery disease)    Hyperlipidemia    GERD (gastroesophageal reflux disease)    H/O cardiac catheterization  8/2019

## 2021-09-18 NOTE — DISCHARGE NOTE PROVIDER - NSDCFUADDAPPT_GEN_ALL_CORE_FT
Leave dressing intact until tomorrow evening, reinforcing with tape if necessary (about 36 hours from  tube removal). At that time you may remove the dressing and take a shower. Place clean gauze over wound if continual drainage. Call Dr. Truong's office at [phone] tomorrow or the next business day to make a followup appointment. Call the office if you experience any fevers, shortness of breath, chest pain or excessive drainage from the incision, day or night. Go to the emergency room if any of these symptoms are severe. Take your medications as ordered and take a stool softener if needed with the narcotic medications.

## 2021-09-18 NOTE — DISCHARGE NOTE PROVIDER - HOSPITAL COURSE
57 yo M PMH of HTN, non-obstructive CAD, current everyday smoker (1 PPD x 34 years), zenker diverticulum, with c/o new onset difficulty swallowing solid foods for about 2 months found to have a zenkers diverticulum.    Now s/p EGD, Zenkers diverticulectomy with myomectomy on 9/17

## 2021-09-18 NOTE — PROGRESS NOTE ADULT - ASSESSMENT
57 yo M PMH of HTN, non-obstructive CAD, current everyday smoker (1 PPD x 34 years), zenker diverticulum, with c/o new onset difficulty swallowing solid foods for about 2 months found to have a zenkers diverticulum.    Now s/p EGD, Zenkers diverticulectomy with myomectomy on 9/17      Plan  CT with oral contrast of  neck/chest today  maintain KACIE drain   Pain mgmt   lipitor for HLD  lopressor for HTN  cont clear diet today  DW DR Truong in am rounds  57 yo M PMH of HTN, non-obstructive CAD, current everyday smoker (1 PPD x 34 years), zenker diverticulum, with c/o new onset difficulty swallowing solid foods for about 2 months found to have a zenkers diverticulum.    Now s/p EGD, Zenkers diverticulectomy with myomectomy on 9/17      Plan  CT with oral contrast of  neck/chest today completed   d/c  KACIE drain   Pain mgmt   lipitor for HLD  lopressor for HTN  advance to full liquid diet   discharge home today   DW DR Truong in am rounds

## 2021-09-18 NOTE — DISCHARGE NOTE PROVIDER - NSDCACTIVITY_GEN_ALL_CORE
Showering allowed/Stairs allowed/Driving allowed/Walking - Indoors allowed/No heavy lifting/straining/Walking - Outdoors allowed/Follow Instructions Provided by your Surgical Team

## 2021-09-18 NOTE — DISCHARGE NOTE PROVIDER - NSDCCPTREATMENT_GEN_ALL_CORE_FT
PRINCIPAL PROCEDURE  Procedure: Zenker's diverticulectomy  Findings and Treatment: w/ removal diverticullum  w/ EGD

## 2021-09-23 ENCOUNTER — APPOINTMENT (OUTPATIENT)
Dept: THORACIC SURGERY | Facility: CLINIC | Age: 56
End: 2021-09-23
Payer: MEDICAID

## 2021-09-23 VITALS
BODY MASS INDEX: 22.54 KG/M2 | HEIGHT: 68 IN | OXYGEN SATURATION: 98 % | HEART RATE: 99 BPM | SYSTOLIC BLOOD PRESSURE: 157 MMHG | WEIGHT: 148.7 LBS | DIASTOLIC BLOOD PRESSURE: 113 MMHG | RESPIRATION RATE: 18 BRPM

## 2021-09-23 LAB — SURGICAL PATHOLOGY STUDY: SIGNIFICANT CHANGE UP

## 2021-09-23 PROCEDURE — 99024 POSTOP FOLLOW-UP VISIT: CPT

## 2021-09-23 RX ORDER — MULTIVITAMIN
CAPSULE ORAL
Refills: 0 | Status: COMPLETED | COMMUNITY
End: 2021-09-23

## 2021-09-27 DIAGNOSIS — Z71.89 OTHER SPECIFIED COUNSELING: ICD-10-CM

## 2021-10-06 PROBLEM — K22.5 ZENKER DIVERTICULA: Status: ACTIVE | Noted: 2021-07-20

## 2021-10-07 ENCOUNTER — APPOINTMENT (OUTPATIENT)
Dept: THORACIC SURGERY | Facility: CLINIC | Age: 56
End: 2021-10-07
Payer: MEDICAID

## 2021-10-07 VITALS
BODY MASS INDEX: 23.04 KG/M2 | RESPIRATION RATE: 18 BRPM | WEIGHT: 152 LBS | DIASTOLIC BLOOD PRESSURE: 98 MMHG | SYSTOLIC BLOOD PRESSURE: 157 MMHG | HEIGHT: 68 IN | HEART RATE: 82 BPM | OXYGEN SATURATION: 100 %

## 2021-10-07 VITALS — TEMPERATURE: 98.3 F

## 2021-10-07 DIAGNOSIS — K22.5 DIVERTICULUM OF ESOPHAGUS, ACQUIRED: ICD-10-CM

## 2021-10-07 DIAGNOSIS — R13.10 DYSPHAGIA, UNSPECIFIED: ICD-10-CM

## 2021-10-07 PROCEDURE — 99024 POSTOP FOLLOW-UP VISIT: CPT

## 2021-12-01 PROCEDURE — G9005: CPT

## 2022-01-06 ENCOUNTER — APPOINTMENT (OUTPATIENT)
Dept: THORACIC SURGERY | Facility: CLINIC | Age: 57
End: 2022-01-06

## 2022-01-13 ENCOUNTER — APPOINTMENT (OUTPATIENT)
Dept: THORACIC SURGERY | Facility: CLINIC | Age: 57
End: 2022-01-13

## 2022-01-13 NOTE — HISTORY OF PRESENT ILLNESS
[FreeTextEntry1] : Mr. SAINZ is a 56 year old male, current everyday smoker (1 PPD x 34 years), active smoker referred from [facility] 7.13.21 for symptoms of new onset difficulty swallowing with solid foods for 3 weeks. Past medical history includes CAD, HTN , and Zenker diverticulum.\par \par He is now s/p

## 2022-07-26 NOTE — ASU PREOP CHECKLIST - HAND OFF
yes Xenograft Text: The defect edges were debeveled with a #15 scalpel blade.  Given the location of the defect, shape of the defect and the proximity to free margins a xenograft was deemed most appropriate.  The graft was then trimmed to fit the size of the defect.  The graft was then placed in the primary defect and oriented appropriately.

## 2023-08-24 NOTE — ED CDU PROVIDER SUBSEQUENT DAY NOTE - EYES [-], MLM
no visual changes Render In Strict Bullet Format?: No Initiate Treatment: Otezla 30mg as directed. Rx to be sent if patient wishes to continue with oral therapy. Triamcinolone 0.1% twice a day, x2 weeks, 1 week off, prn flares. Samples Given: Otezla x2 packs. Discontinue Regimen: Drake Beltre Detail Level: Zone

## 2024-02-12 ENCOUNTER — EMERGENCY (EMERGENCY)
Facility: HOSPITAL | Age: 59
LOS: 1 days | Discharge: HOME | End: 2024-02-12
Attending: EMERGENCY MEDICINE
Payer: COMMERCIAL

## 2024-02-12 VITALS
DIASTOLIC BLOOD PRESSURE: 87 MMHG | HEART RATE: 110 BPM | TEMPERATURE: 98 F | SYSTOLIC BLOOD PRESSURE: 157 MMHG | RESPIRATION RATE: 18 BRPM | OXYGEN SATURATION: 98 %

## 2024-02-12 DIAGNOSIS — Z98.890 OTHER SPECIFIED POSTPROCEDURAL STATES: Chronic | ICD-10-CM

## 2024-02-12 PROCEDURE — 99285 EMERGENCY DEPT VISIT HI MDM: CPT | Mod: 25

## 2024-02-12 RX ORDER — ACETAMINOPHEN 500 MG
1000 TABLET ORAL ONCE
Refills: 0 | Status: COMPLETED | OUTPATIENT
Start: 2024-02-12 | End: 2024-02-12

## 2024-02-12 RX ORDER — DEXAMETHASONE 0.5 MG/5ML
10 ELIXIR ORAL ONCE
Refills: 0 | Status: COMPLETED | OUTPATIENT
Start: 2024-02-12 | End: 2024-02-12

## 2024-02-12 RX ORDER — KETOROLAC TROMETHAMINE 30 MG/ML
30 SYRINGE (ML) INJECTION ONCE
Refills: 0 | Status: DISCONTINUED | OUTPATIENT
Start: 2024-02-12 | End: 2024-02-12

## 2024-02-12 RX ORDER — LIDOCAINE 4 G/100G
1 CREAM TOPICAL ONCE
Refills: 0 | Status: COMPLETED | OUTPATIENT
Start: 2024-02-12 | End: 2024-02-12

## 2024-02-12 RX ORDER — DEXAMETHASONE 0.5 MG/5ML
10 ELIXIR ORAL ONCE
Refills: 0 | Status: DISCONTINUED | OUTPATIENT
Start: 2024-02-12 | End: 2024-02-12

## 2024-02-12 RX ORDER — DIAZEPAM 5 MG
5 TABLET ORAL ONCE
Refills: 0 | Status: DISCONTINUED | OUTPATIENT
Start: 2024-02-12 | End: 2024-02-12

## 2024-02-12 RX ADMIN — Medication 5 MILLIGRAM(S): at 23:47

## 2024-02-12 RX ADMIN — LIDOCAINE 1 PATCH: 4 CREAM TOPICAL at 23:47

## 2024-02-12 RX ADMIN — Medication 400 MILLIGRAM(S): at 23:46

## 2024-02-12 RX ADMIN — Medication 30 MILLIGRAM(S): at 23:46

## 2024-02-12 NOTE — ED ADULT TRIAGE NOTE - CHIEF COMPLAINT QUOTE
pateint complaining of lower back pain, severe, onset 3 days ago with pain down left leg, nontraumatic,

## 2024-02-12 NOTE — ED PROVIDER NOTE - ATTENDING APP SHARED VISIT CONTRIBUTION OF CARE
I, Gibson Jorgensen, performed the initial face to face bedside interview with this patient regarding history of present illness, review of symptoms and relevant past medical, social and family history.  I completed an independent physical examination.  I was the initial provider who evaluated this patient. I have signed out the follow up of any pending tests (i.e. labs, radiological studies) to the ACP.  I have communicated the patient’s plan of care and disposition with the ACP.

## 2024-02-12 NOTE — ED PROVIDER NOTE - OBJECTIVE STATEMENT
57 yo M hx HTN no longer on meds 59 yo M hx HTN no longer on meds, smoker presents to ER c/o back pain. Pt reports pain started 3 days ago to bilateral lower back, constant but with intermittent spasms, radiates occasionally to LLE. states pain was improved yesterday but worse again today. denies numbness, tingling, weakness, bowel or bladder incontinence, saddle anesthesia, abd pain, CP, SOB. pt reports he does a lot of heavy lifting at work but denies anything particularly worse this week.

## 2024-02-12 NOTE — ED PROVIDER NOTE - NSICDXPASTMEDICALHX_GEN_ALL_CORE_FT
PAST MEDICAL HISTORY:  CAD (coronary artery disease)     Current smoker     Diverticulum of esophagus, acquired     GERD (gastroesophageal reflux disease)     HTN (hypertension)     Hyperlipidemia

## 2024-02-12 NOTE — ED PROVIDER NOTE - PHYSICAL EXAMINATION
Gen: Moderate distress, intermittently screaming in pain  HEENT: Mucous membranes moist, pink conjunctivae, EOMI  CV: RRR, nl s1/s2.  Resp: CTAB, normal rate and effort  GI: Abdomen soft, non-tender  : No CVAT  Neuro: A&O x 3, moving all 4 extremities  MSK: Lumbar mildine/paraspinal ttp. Full ROM BLE. 5/5 strength BUE/BLE, sensation intact throughout, 2+ distal pulses  Skin: No rashes. intact and perfused.

## 2024-02-12 NOTE — ED PROVIDER NOTE - CLINICAL SUMMARY MEDICAL DECISION MAKING FREE TEXT BOX
59 yo M hx HTN no longer on meds, smoker presents to ER c/o back pain. Pt reports pain started 3 days ago to bilateral lower back, constant but with intermittent spasms, worse w movement, radiates occasionally to LLE. states pain was improved yesterday but worse again today. on exam pt in moderate distress intermittently scrams in pain w spasms. +lumbar tenderness.   CT no stones noted, +degenerative changes L4-5 L5-S1  Pt still in significant pain, will keep on obs for pain control, MRI, pain mgmt consult

## 2024-02-13 VITALS
SYSTOLIC BLOOD PRESSURE: 159 MMHG | HEART RATE: 82 BPM | RESPIRATION RATE: 18 BRPM | TEMPERATURE: 98 F | DIASTOLIC BLOOD PRESSURE: 85 MMHG | OXYGEN SATURATION: 99 %

## 2024-02-13 LAB
ALBUMIN SERPL ELPH-MCNC: 4.3 G/DL — SIGNIFICANT CHANGE UP (ref 3.3–5.2)
ALP SERPL-CCNC: 84 U/L — SIGNIFICANT CHANGE UP (ref 40–120)
ALT FLD-CCNC: 16 U/L — SIGNIFICANT CHANGE UP
ANION GAP SERPL CALC-SCNC: 15 MMOL/L — SIGNIFICANT CHANGE UP (ref 5–17)
APPEARANCE UR: ABNORMAL
APTT BLD: 27.5 SEC — SIGNIFICANT CHANGE UP (ref 24.5–35.6)
AST SERPL-CCNC: 18 U/L — SIGNIFICANT CHANGE UP
BACTERIA # UR AUTO: NEGATIVE /HPF — SIGNIFICANT CHANGE UP
BASOPHILS # BLD AUTO: 0.06 K/UL — SIGNIFICANT CHANGE UP (ref 0–0.2)
BASOPHILS NFR BLD AUTO: 0.5 % — SIGNIFICANT CHANGE UP (ref 0–2)
BILIRUB SERPL-MCNC: <0.2 MG/DL — LOW (ref 0.4–2)
BILIRUB UR-MCNC: NEGATIVE — SIGNIFICANT CHANGE UP
BUN SERPL-MCNC: 13.2 MG/DL — SIGNIFICANT CHANGE UP (ref 8–20)
CALCIUM SERPL-MCNC: 9.3 MG/DL — SIGNIFICANT CHANGE UP (ref 8.4–10.5)
CAST: 0 /LPF — SIGNIFICANT CHANGE UP (ref 0–4)
CHLORIDE SERPL-SCNC: 101 MMOL/L — SIGNIFICANT CHANGE UP (ref 96–108)
CO2 SERPL-SCNC: 20 MMOL/L — LOW (ref 22–29)
COLOR SPEC: YELLOW — SIGNIFICANT CHANGE UP
CREAT SERPL-MCNC: 1.06 MG/DL — SIGNIFICANT CHANGE UP (ref 0.5–1.3)
DIFF PNL FLD: NEGATIVE — SIGNIFICANT CHANGE UP
EGFR: 82 ML/MIN/1.73M2 — SIGNIFICANT CHANGE UP
EOSINOPHIL # BLD AUTO: 0.24 K/UL — SIGNIFICANT CHANGE UP (ref 0–0.5)
EOSINOPHIL NFR BLD AUTO: 2 % — SIGNIFICANT CHANGE UP (ref 0–6)
GLUCOSE SERPL-MCNC: 120 MG/DL — HIGH (ref 70–99)
GLUCOSE UR QL: NEGATIVE MG/DL — SIGNIFICANT CHANGE UP
HCT VFR BLD CALC: 45.5 % — SIGNIFICANT CHANGE UP (ref 39–50)
HGB BLD-MCNC: 16.4 G/DL — SIGNIFICANT CHANGE UP (ref 13–17)
IMM GRANULOCYTES NFR BLD AUTO: 0.8 % — SIGNIFICANT CHANGE UP (ref 0–0.9)
INR BLD: 0.96 RATIO — SIGNIFICANT CHANGE UP (ref 0.85–1.18)
KETONES UR-MCNC: NEGATIVE MG/DL — SIGNIFICANT CHANGE UP
LEUKOCYTE ESTERASE UR-ACNC: NEGATIVE — SIGNIFICANT CHANGE UP
LYMPHOCYTES # BLD AUTO: 1.41 K/UL — SIGNIFICANT CHANGE UP (ref 1–3.3)
LYMPHOCYTES # BLD AUTO: 11.7 % — LOW (ref 13–44)
MCHC RBC-ENTMCNC: 33.7 PG — SIGNIFICANT CHANGE UP (ref 27–34)
MCHC RBC-ENTMCNC: 36 GM/DL — SIGNIFICANT CHANGE UP (ref 32–36)
MCV RBC AUTO: 93.6 FL — SIGNIFICANT CHANGE UP (ref 80–100)
MONOCYTES # BLD AUTO: 0.74 K/UL — SIGNIFICANT CHANGE UP (ref 0–0.9)
MONOCYTES NFR BLD AUTO: 6.1 % — SIGNIFICANT CHANGE UP (ref 2–14)
NEUTROPHILS # BLD AUTO: 9.52 K/UL — HIGH (ref 1.8–7.4)
NEUTROPHILS NFR BLD AUTO: 78.9 % — HIGH (ref 43–77)
NITRITE UR-MCNC: NEGATIVE — SIGNIFICANT CHANGE UP
PH UR: 5.5 — SIGNIFICANT CHANGE UP (ref 5–8)
PLATELET # BLD AUTO: 301 K/UL — SIGNIFICANT CHANGE UP (ref 150–400)
POTASSIUM SERPL-MCNC: 4.3 MMOL/L — SIGNIFICANT CHANGE UP (ref 3.5–5.3)
POTASSIUM SERPL-SCNC: 4.3 MMOL/L — SIGNIFICANT CHANGE UP (ref 3.5–5.3)
PROT SERPL-MCNC: 7.3 G/DL — SIGNIFICANT CHANGE UP (ref 6.6–8.7)
PROT UR-MCNC: NEGATIVE MG/DL — SIGNIFICANT CHANGE UP
PROTHROM AB SERPL-ACNC: 10.7 SEC — SIGNIFICANT CHANGE UP (ref 9.5–13)
RBC # BLD: 4.86 M/UL — SIGNIFICANT CHANGE UP (ref 4.2–5.8)
RBC # FLD: 11.8 % — SIGNIFICANT CHANGE UP (ref 10.3–14.5)
RBC CASTS # UR COMP ASSIST: 1 /HPF — SIGNIFICANT CHANGE UP (ref 0–4)
SODIUM SERPL-SCNC: 136 MMOL/L — SIGNIFICANT CHANGE UP (ref 135–145)
SP GR SPEC: 1.02 — SIGNIFICANT CHANGE UP (ref 1–1.03)
SQUAMOUS # UR AUTO: 0 /HPF — SIGNIFICANT CHANGE UP (ref 0–5)
UROBILINOGEN FLD QL: 0.2 MG/DL — SIGNIFICANT CHANGE UP (ref 0.2–1)
WBC # BLD: 12.07 K/UL — HIGH (ref 3.8–10.5)
WBC # FLD AUTO: 12.07 K/UL — HIGH (ref 3.8–10.5)
WBC UR QL: 2 /HPF — SIGNIFICANT CHANGE UP (ref 0–5)

## 2024-02-13 PROCEDURE — 96375 TX/PRO/DX INJ NEW DRUG ADDON: CPT

## 2024-02-13 PROCEDURE — 96376 TX/PRO/DX INJ SAME DRUG ADON: CPT

## 2024-02-13 PROCEDURE — 85730 THROMBOPLASTIN TIME PARTIAL: CPT

## 2024-02-13 PROCEDURE — 72148 MRI LUMBAR SPINE W/O DYE: CPT | Mod: 26,MA

## 2024-02-13 PROCEDURE — 72131 CT LUMBAR SPINE W/O DYE: CPT | Mod: 26,MA

## 2024-02-13 PROCEDURE — 85025 COMPLETE CBC W/AUTO DIFF WBC: CPT

## 2024-02-13 PROCEDURE — 81001 URINALYSIS AUTO W/SCOPE: CPT

## 2024-02-13 PROCEDURE — 74176 CT ABD & PELVIS W/O CONTRAST: CPT | Mod: 26,MA

## 2024-02-13 PROCEDURE — 80053 COMPREHEN METABOLIC PANEL: CPT

## 2024-02-13 PROCEDURE — 36415 COLL VENOUS BLD VENIPUNCTURE: CPT

## 2024-02-13 PROCEDURE — G0378: CPT

## 2024-02-13 PROCEDURE — 85610 PROTHROMBIN TIME: CPT

## 2024-02-13 PROCEDURE — 74176 CT ABD & PELVIS W/O CONTRAST: CPT | Mod: MA

## 2024-02-13 PROCEDURE — 96374 THER/PROPH/DIAG INJ IV PUSH: CPT

## 2024-02-13 PROCEDURE — 99284 EMERGENCY DEPT VISIT MOD MDM: CPT

## 2024-02-13 PROCEDURE — 72148 MRI LUMBAR SPINE W/O DYE: CPT | Mod: MA

## 2024-02-13 PROCEDURE — 99223 1ST HOSP IP/OBS HIGH 75: CPT

## 2024-02-13 RX ORDER — METHOCARBAMOL 500 MG/1
2 TABLET, FILM COATED ORAL
Qty: 24 | Refills: 0
Start: 2024-02-13 | End: 2024-02-16

## 2024-02-13 RX ORDER — IBUPROFEN 200 MG
1 TABLET ORAL
Qty: 20 | Refills: 0
Start: 2024-02-13 | End: 2024-02-17

## 2024-02-13 RX ORDER — OXYCODONE HYDROCHLORIDE 5 MG/1
5 TABLET ORAL EVERY 6 HOURS
Refills: 0 | Status: DISCONTINUED | OUTPATIENT
Start: 2024-02-13 | End: 2024-02-13

## 2024-02-13 RX ORDER — LIDOCAINE 4 G/100G
1 CREAM TOPICAL
Qty: 5 | Refills: 0
Start: 2024-02-13 | End: 2024-02-17

## 2024-02-13 RX ORDER — IBUPROFEN 200 MG
600 TABLET ORAL EVERY 6 HOURS
Refills: 0 | Status: DISCONTINUED | OUTPATIENT
Start: 2024-02-13 | End: 2024-02-20

## 2024-02-13 RX ORDER — METHOCARBAMOL 500 MG/1
750 TABLET, FILM COATED ORAL EVERY 8 HOURS
Refills: 0 | Status: DISCONTINUED | OUTPATIENT
Start: 2024-02-13 | End: 2024-02-20

## 2024-02-13 RX ORDER — OXYCODONE HYDROCHLORIDE 5 MG/1
1 TABLET ORAL
Qty: 8 | Refills: 0
Start: 2024-02-13 | End: 2024-02-14

## 2024-02-13 RX ORDER — MORPHINE SULFATE 50 MG/1
4 CAPSULE, EXTENDED RELEASE ORAL EVERY 6 HOURS
Refills: 0 | Status: DISCONTINUED | OUTPATIENT
Start: 2024-02-13 | End: 2024-02-13

## 2024-02-13 RX ORDER — MORPHINE SULFATE 50 MG/1
4 CAPSULE, EXTENDED RELEASE ORAL ONCE
Refills: 0 | Status: DISCONTINUED | OUTPATIENT
Start: 2024-02-13 | End: 2024-02-13

## 2024-02-13 RX ADMIN — METHOCARBAMOL 750 MILLIGRAM(S): 500 TABLET, FILM COATED ORAL at 05:08

## 2024-02-13 RX ADMIN — LIDOCAINE 1 PATCH: 4 CREAM TOPICAL at 07:38

## 2024-02-13 RX ADMIN — MORPHINE SULFATE 4 MILLIGRAM(S): 50 CAPSULE, EXTENDED RELEASE ORAL at 02:15

## 2024-02-13 RX ADMIN — Medication 1000 MILLIGRAM(S): at 00:16

## 2024-02-13 RX ADMIN — MORPHINE SULFATE 4 MILLIGRAM(S): 50 CAPSULE, EXTENDED RELEASE ORAL at 07:29

## 2024-02-13 RX ADMIN — Medication 600 MILLIGRAM(S): at 05:38

## 2024-02-13 RX ADMIN — Medication 600 MILLIGRAM(S): at 05:08

## 2024-02-13 RX ADMIN — Medication 600 MILLIGRAM(S): at 11:20

## 2024-02-13 RX ADMIN — Medication 102 MILLIGRAM(S): at 00:19

## 2024-02-13 RX ADMIN — MORPHINE SULFATE 4 MILLIGRAM(S): 50 CAPSULE, EXTENDED RELEASE ORAL at 01:59

## 2024-02-13 RX ADMIN — Medication 30 MILLIGRAM(S): at 00:16

## 2024-02-13 RX ADMIN — MORPHINE SULFATE 4 MILLIGRAM(S): 50 CAPSULE, EXTENDED RELEASE ORAL at 07:44

## 2024-02-13 RX ADMIN — LIDOCAINE 1 PATCH: 4 CREAM TOPICAL at 11:22

## 2024-02-13 NOTE — ED ADULT NURSE REASSESSMENT NOTE - NS ED NURSE REASSESS COMMENT FT1
Pt. care assumed at 0730. Pt received A&Ox4, pt. currently c/o lower back pain, medicated as per order. Pt. repositioned for comfort. Pt. pending MRI results, charting as noted.

## 2024-02-13 NOTE — ED CDU PROVIDER INITIAL DAY NOTE - PHYSICAL EXAMINATION
Gen: Moderate distress, intermittently screaming in pain  HEENT: Mucous membranes moist, pink conjunctivae, EOMI  CV: RRR, nl s1/s2.  Resp: CTAB, normal rate and effort  GI: Abdomen soft, non-tender  : No CVAT  Neuro: A&O x 3, moving all 4 extremities  MSK: Lumbar midline/paraspinal ttp. Full ROM BLE. 5/5 strength BUE/BLE, sensation intact throughout, 2+ distal pulses  Skin: No rashes. intact and perfused.

## 2024-02-13 NOTE — ED ADULT NURSE NOTE - CAS DISCH TRANSFER METHOD
Patient Name: Monalisa Olguin  Medical Record Number: [id number]  Today's Date: 7/27/2020    Procedure: paracentesis, diagnostic and thereaupic  Proceduralist: Dr. Thacker    Procedure Start: 0945  Procedure end: 1000  Sedation medications administered: local anesthetic only     Report given to: ANAMARIA Kay RN      Other Notes: Pt arrived to IR room 1  from . Consent reviewed. Pt denies any questions or concerns regarding procedure. Pt positioned siupine and monitored per protocol. Pt tolerated procedure without any noted complications. Pt transferred back to .  Ascites 2800 ml drained.   Private car

## 2024-02-13 NOTE — CONSULT NOTE ADULT - SUBJECTIVE AND OBJECTIVE BOX
CC: back pain    HPI: per chart review:  · HPI Objective Statement: 57 yo M hx HTN no longer on meds, smoker presents to ER c/o back pain. Pt reports pain started 3 days ago to bilateral lower back, constant but with intermittent spasms, radiates occasionally to LLE. states pain was improved yesterday but worse again today. denies numbness, tingling, weakness, bowel or bladder incontinence, saddle anesthesia, abd pain, CP, SOB. pt reports he does a lot of heavy lifting at work but denies anything particularly worse this week.        Analgesic Dosing for past 24 hours reviewed as below:  acetaminophen   IVPB ..   400 mL/Hr IV Intermittent (24 @ 23:46)    diazepam  Injectable   5 milliGRAM(s) IV Push (24 @ 23:47)    ibuprofen  Tablet.   600 milliGRAM(s) Oral (24 @ 11:20)   600 milliGRAM(s) Oral (24 @ 05:08)    ketorolac   Injectable   30 milliGRAM(s) IV Push (24 @ 23:46)    methocarbamol   750 milliGRAM(s) Oral (24 @ 05:08)    morphine  - Injectable   4 milliGRAM(s) IV Push (24 @ 01:59)    morphine  - Injectable   4 milliGRAM(s) IV Push (24 @ 07:29)          T(C): 36.5 (24 @ 11:21), Max: 36.9 (24 @ 08:06)  HR: 82 (24 @ 11:21) (75 - 110)  BP: 159/85 (24 @ 11:21) (149/90 - 183/104)  RR: 18 (24 @ 11:21) (15 - 18)  SpO2: 99% (24 @ 11:21) (97% - 99%)        ibuprofen  Tablet. 600 milliGRAM(s) Oral every 6 hours  methocarbamol 750 milliGRAM(s) Oral every 8 hours  morphine  - Injectable 4 milliGRAM(s) IV Push every 6 hours PRN  oxyCODONE    IR 5 milliGRAM(s) Oral every 6 hours PRN                          16.4   12.07 )-----------( 301      ( 2024 23:51 )             45.5     02-12    136  |  101  |  13.2  ----------------------------<  120<H>  4.3   |  20.0<L>  |  1.06    Ca    9.3      2024 23:51    TPro  7.3  /  Alb  4.3  /  TBili  <0.2<L>  /  DBili  x   /  AST  18  /  ALT  16  /  AlkPhos  84  02-12    PT/INR - ( 2024 23:51 )   PT: 10.7 sec;   INR: 0.96 ratio         PTT - ( 2024 23:51 )  PTT:27.5 sec  Urinalysis Basic - ( 2024 03:53 )    Color: Yellow / Appearance: Cloudy / S.020 / pH: x  Gluc: x / Ketone: Negative mg/dL  / Bili: Negative / Urobili: 0.2 mg/dL   Blood: x / Protein: Negative mg/dL / Nitrite: Negative   Leuk Esterase: Negative / RBC: 1 /HPF / WBC 2 /HPF   Sq Epi: x / Non Sq Epi: 0 /HPF / Bacteria: Negative /HPF        Pain Service   [phone]

## 2024-02-13 NOTE — ED ADULT NURSE NOTE - NSFALLRISKINTERV_ED_ALL_ED

## 2024-02-13 NOTE — ED CDU PROVIDER INITIAL DAY NOTE - CLINICAL SUMMARY MEDICAL DECISION MAKING FREE TEXT BOX
57 yo M hx HTN no longer on meds, smoker presents to ER c/o back pain. Pt reports pain started 3 days ago to bilateral lower back, constant but with intermittent spasms, worse w movement, radiates occasionally to LLE. states pain was improved yesterday but worse again today. on exam pt in moderate distress intermittently scrams in pain w spasms. +lumbar tenderness.   CT no stones noted, +degenerative changes L4-5 L5-S1  Pt still in significant pain, will keep on obs for pain control, MRI, pain mgmt consult

## 2024-02-13 NOTE — ED CDU PROVIDER INITIAL DAY NOTE - ATTENDING APP SHARED VISIT CONTRIBUTION OF CARE
58-year-old male history of hypertension presents with 3 days of bilateral lower back pain with spasm worse with movement. (-) incontinence or retention of bowel or bladder (-) fever (-) saddle anesthesia . no neurological deficit.  Lab values do not require emergent intervention. CT scan showed no stones noted, +degenerative changes L4-5 L5-S1.   Pain not relieved with medication.  Patient placed in observation for MRI and pain management.

## 2024-02-13 NOTE — ED CDU PROVIDER DISPOSITION NOTE - NSFOLLOWUPINSTRUCTIONS_ED_ALL_ED_FT
Patient education: Low back pain – Discharge instructions (The Basics)  Written by the doctors and editors at [facility]  Please read the Disclaimer at the end of this page.    What are discharge instructions?  Discharge instructions are information about how to take care of yourself after getting medical care for a health problem.    What is low back pain?  Low back pain is pain or discomfort in the lower part of your back. Many people have low back pain at some point, and it most often gets better on its own. Many different things can cause low back pain. Most of the time, doctors do not know the exact cause.    Back pain can happen if you strain a muscle. This is often what has happened when a person "throws out" their back. This refers to pain that starts suddenly after physical activity, like lifting something heavy or bending the back.    Back pain can also happen if you have (figure 1):    ?Damaged, bulging, or torn discs    ?Arthritis affecting the joints of the spine    ?Bony growths on the vertebrae that crowd nearby nerves    ?A "compression fracture" due to osteoporosis (a condition that makes your bones weak)    ?A vertebra out of place    ?Narrowing in the spinal canal    ?A tumor or infection (but this is very rare)    How do I care for myself at home?  Ask the doctor or nurse what you should do when you go home. Make sure that you understand exactly what you need to do to care for yourself. Ask questions if there is anything you do not understand.    You should also:    ?Use heat on your back for short periods of time, if it helps with pain. Put a heating pad (on the low setting) on your back for 20 minutes at a time a few times each day. Never go to sleep with heat on your back.    ?Try to stay as active as you can without causing too much pain, if your doctor or nurse said that it is OK. If your pain is severe, you might need to rest for a day or 2. But it's important to get back to walking and moving as soon as possible. Try to keep doing your normal daily activities. Get up and move around gently during the day as you are able.    ?Slowly start to increase your activity level as you are able to. If something causes your pain to come back or get worse, stop and go back to doing easier activities that did not hurt.    ?Avoid sitting or standing in 1 position for a long time. You might want to sleep with a pillow under or between your knees if this eases your pain.    ?Take a medicine like ibuprofen (sample brand names: Advil, Motrin) or naproxen (brand name: Aleve) for pain, if needed. These are nonsteroidal antiinflammatory drugs ("NSAIDs"). They might work better than acetaminophen for low back pain.    ?Talk to your doctor or nurse before trying any of the following. These treatments might help you feel better for a little while:    •Spinal manipulation – This is when a chiropractor, physical therapist, or other professional moves or "adjusts" the joints of your back.    •Acupuncture – This is when someone who knows traditional Chinese medicine inserts tiny needles through your skin to block pain signals.    •Massage therapy – The massage therapist manipulates your muscles and soft tissues to decrease muscle tension and increase relaxation.    What follow-up care do I need?  Your doctor or nurse will tell you if you need to make a follow-up appointment. If so, make sure that you know when and where to go. The doctor might suggest that you see a physical therapist to learn exercises to help with your back pain.    When should I call the doctor?  Call for emergency help right away (in the [place] and [place], call 9-1-1) if:    ?You are unable to walk, or cannot control your bowels or bladder.    ?You develop a fever of 100.4°F (38°C) or higher, chills, or night sweats.    Call your doctor for advice if:    ?Your legs are numb, weak, or tingly.    ?Your pain is getting worse, even with medicines and rest.    ?You develop a new rash.

## 2024-02-13 NOTE — ED ADULT NURSE NOTE - OBJECTIVE STATEMENT
pt presents to ED in NAD c/o general lower back pain. pt denies heavy lifting. pt denies chest pain, N/V, SOB, fever, chills, lightheadedness, and dizziness. pt breathing even and unlabored at this time.

## 2024-02-13 NOTE — ED CDU PROVIDER DISPOSITION NOTE - CARE PROVIDER_API CALL
Arpita Yancey  Pain Medicine  [address] [place] [address], [place] [address]  Phone: [phone]  Fax: [phone]  Follow Up Time:

## 2024-02-13 NOTE — CONSULT NOTE ADULT - ASSESSMENT
58M  acute onset LBP x3 days  denies radition to BLE  endorses good relief with medication therapy  MRI reviewed    facet loading neg b/l  SIJ NT b/l  SLR neg b/l  lumbar paraspinals mildly TTP    patient w likely myofascial pain syndrome of lumbar paraspinal muscles  no indication for interventional injections at this time  will recommend continuing medication therapy with non-opioid analgeics    cont robaxin, NSAIDs and lido patches to area  can follow in office PRN

## 2024-02-13 NOTE — ED CDU PROVIDER DISPOSITION NOTE - CLINICAL COURSE
57 yo M hx HTN no longer on meds, smoker presents to ER c/o back pain. Pt reports pain started 3 days ago to bilateral lower back, constant but with intermittent spasms, radiates occasionally to LLE. Pt with no acute findings on initial evaluation, MR reynoso acute findings, Pt cleared for dc home with PO meds, supportive care, follow up, educated about when to return to the ED if needed. PT verbalizes that he understands all instructions and results. Pt informed that ED is open and available 24/7 365 days a yr, encouraged to return to the ED if they have any change in condition, or feel the need for revaluation.

## 2024-02-13 NOTE — ED ADULT NURSE REASSESSMENT NOTE - NS ED NURSE REASSESS COMMENT FT1
Pt is resting in bed comfortably at this time, no apparent distress noted at this time. pt safety maintained. Pt denies any complaints at this time. pt updated on plan of care, charting as noted.

## 2024-02-13 NOTE — ED CDU PROVIDER DISPOSITION NOTE - PATIENT PORTAL LINK FT
You can access the FollowMyHealth Patient Portal offered by [facility] by registering at the following website: [email or web address]. By joining [facility]’s FollowMyHealth portal, you will also be able to view your health information using other applications (apps) compatible with our system.

## 2024-02-13 NOTE — ED CDU PROVIDER INITIAL DAY NOTE - OBJECTIVE STATEMENT
59 yo M hx HTN no longer on meds, smoker presents to ER c/o back pain. Pt reports pain started 3 days ago to bilateral lower back, constant but with intermittent spasms, worse w movement, radiates occasionally to LLE. states pain was improved yesterday but worse again today. denies numbness, tingling, weakness, bowel or bladder incontinence, saddle anesthesia, abd pain, CP, SOB. pt reports he does a lot of heavy lifting at work but denies anything particularly worse this week.

## 2024-02-13 NOTE — ED CDU PROVIDER DISPOSITION NOTE - ADDITIONAL NOTES AND INSTRUCTIONS:
PT was evaluated At [facility] ED and was found to have a condition that warranted time of to rest and heal from WORK/SCHOOL.   Kristian Mott PA-C

## 2024-03-12 ENCOUNTER — APPOINTMENT (OUTPATIENT)
Dept: NEUROSURGERY | Facility: CLINIC | Age: 59
End: 2024-03-12
Payer: MEDICAID

## 2024-03-12 VITALS
TEMPERATURE: 98.5 F | DIASTOLIC BLOOD PRESSURE: 90 MMHG | HEIGHT: 68 IN | OXYGEN SATURATION: 98 % | HEART RATE: 92 BPM | SYSTOLIC BLOOD PRESSURE: 137 MMHG | BODY MASS INDEX: 24.25 KG/M2 | WEIGHT: 160 LBS

## 2024-03-12 PROCEDURE — 99202 OFFICE O/P NEW SF 15 MIN: CPT

## 2024-03-12 RX ORDER — GABAPENTIN 300 MG/1
300 CAPSULE ORAL 3 TIMES DAILY
Qty: 90 | Refills: 2 | Status: ACTIVE | COMMUNITY
Start: 2024-03-12 | End: 1900-01-01

## 2024-03-12 NOTE — ASSESSMENT
[FreeTextEntry1] : Mr. Reggie Keita (Gil) is a very pleasant 58 year old male with a history of HTN, active smoking, Zenker diverticula s/p surgery with residual hoarseness who presents with an episode of severe low back pain a month ago and now with left foot numbness/pins and needles.  I discussed with him and his son my recommendation for conservative management in the form of physical therapy as well as pain management for injections.  I would like to start him on gabapentin and obtain XR flexion/extension and EMG/NCS.  I would like to see him back in the office in 6 weeks.  All questions answered.  He knows to call my office with any issues or concerns.

## 2024-03-12 NOTE — PHYSICAL EXAM
[Oriented To Time, Place, And Person] : oriented to person, place, and time [FreeTextEntry6] : BUE 5/5 strength LLE DF/PF 4/5, rest 5/5 strength No venegas's 1+ reflexes Left limp when walking Difficulty standing on left toes and heels

## 2024-03-12 NOTE — HISTORY OF PRESENT ILLNESS
[FreeTextEntry1] : episode of low back pain now with left foot pins/needles [de-identified] : Mr. Reggie Keita (Gil) is a very pleasant 58 year old male with a history of HTN, active smoking, Zenker diverticula s/p surgery with residual hoarseness who presents with an episode of severe low back pain a month ago and now with left foot numbness/pins and needles.  He reports that about a month ago, he was bending down to grab a paper plate from a cabinet when all of a sudden he developed very severe bilateral low back pain.  It was so severe that he was unable to get up and he had to go to the emergency department for treatment.  Since then, the pain has improved but he now has symptoms in his left foot, particularly the big toe and the third toe.  He describes a numbness and a pins-and-needles sensation that is worse depending on position as well as at nighttime.  This sensation wakes him up at night and causes him to have to get up and change positions to relieve the symptoms.  He notes that he also has pain in the front of his shin as well as the lateral calf.  He is on a muscle relaxer.  He notes that walking makes his entire calf stiff and cramping.  He denies any bowel bladder dysfunction.  No trouble with walking or with weakness.  PMH: as above Works as  Lives at home with wife and sons Smokes 1 pack per day

## 2024-04-02 NOTE — DISCHARGE NOTE ADULT - HOSPITAL COURSE
Pt called and said she had shingles last April , ever since she has had itching , pt stated pain management told her to try the shingles vaccine but needed to talk to you about if she can get it or not.    53M smoker with hx HTN, HLD (Untreated), family history of premature CAD (Identical twin brother  of MI at age 42) presented to ED with 3 weeks history of CP , he was seen by cardiology , tropoinin negative   CTA of coronaries done abnormal, had cardiac cath day 2 nonobstructive CAD , pt is with high cholesterol not on statin , instructed to continue taking it watch his diet and follwo up with his primary care physician .   Counseling given to stop smoking , he is motivated   discharged home in stable condition

## 2024-04-23 ENCOUNTER — APPOINTMENT (OUTPATIENT)
Dept: NEUROSURGERY | Facility: CLINIC | Age: 59
End: 2024-04-23
Payer: MEDICAID

## 2024-04-23 VITALS
WEIGHT: 158 LBS | HEART RATE: 87 BPM | DIASTOLIC BLOOD PRESSURE: 91 MMHG | HEIGHT: 68 IN | BODY MASS INDEX: 23.95 KG/M2 | SYSTOLIC BLOOD PRESSURE: 134 MMHG | TEMPERATURE: 97.8 F | OXYGEN SATURATION: 97 %

## 2024-04-23 DIAGNOSIS — M54.9 DORSALGIA, UNSPECIFIED: ICD-10-CM

## 2024-04-23 DIAGNOSIS — R20.0 ANESTHESIA OF SKIN: ICD-10-CM

## 2024-04-23 PROCEDURE — 99212 OFFICE O/P EST SF 10 MIN: CPT

## 2024-04-23 NOTE — ASSESSMENT
[FreeTextEntry1] : Mr. Reggie Keita (Gil) is a very pleasant 58 year old male with a history of HTN, active smoking, Zenker diverticula s/p surgery with residual hoarseness who presents for follow-up of an episode of severe low back pain in February and now with left foot numbness/pins and needles. His symptoms have improved with gabapentin. He has been doing physical therapy but has not seen pain management or undergone the XR/EMG that I recommended. I discussed with him my recommendation for continued conservative therapy and will see him back in 6 weeks to check on how he is doing. If he has worsening symptoms or new pain, he will let me know and we can consider pain management. All questions answered. He knows to call my office with any issues or concerns.

## 2024-04-23 NOTE — HISTORY OF PRESENT ILLNESS
[FreeTextEntry1] : Mr. Reggie Keita (Gil) is a very pleasant 58 year old male with a history of HTN, active smoking, Zenker diverticula s/p surgery with residual hoarseness who presents for follow-up of an episode of severe low back pain in February and now with left foot numbness/pins and needles. He overall reports that he is feeling better with gabapentin. He has a little numbness in his left foot still but this is better. He notes some toe cramping last night. He denies any pain. He has been doing physical therapy but has not seen pain management or undergone the XR/EMG that I recommended.

## 2024-06-04 ENCOUNTER — NON-APPOINTMENT (OUTPATIENT)
Age: 59
End: 2024-06-04

## 2024-06-05 ENCOUNTER — APPOINTMENT (OUTPATIENT)
Dept: NEUROSURGERY | Facility: CLINIC | Age: 59
End: 2024-06-05

## 2024-06-05 VITALS
HEART RATE: 93 BPM | DIASTOLIC BLOOD PRESSURE: 94 MMHG | SYSTOLIC BLOOD PRESSURE: 154 MMHG | HEIGHT: 68 IN | WEIGHT: 158 LBS | OXYGEN SATURATION: 99 % | TEMPERATURE: 98.5 F | BODY MASS INDEX: 23.95 KG/M2

## 2024-06-07 ENCOUNTER — APPOINTMENT (OUTPATIENT)
Dept: NEUROSURGERY | Facility: CLINIC | Age: 59
End: 2024-06-07

## 2024-08-05 NOTE — PROGRESS NOTE ADULT - SUBJECTIVE AND OBJECTIVE BOX
"Assessment: AGA 24.6 week male infant delivered via  for placental abruption at [facility]. Transferred to [facility] NICU .    Plan:   Continue discharge planning / screens under problem of \"Routine Health Maintenance\"  Primary Neonatologist: Dg  Continue to update and support family    Safe Sleep: N/A Currently   Physical Therapy: Following inpatient, recommendations for discharge: Home PT    " CARDIOLOGY PROGRESS NOTE   ([facility])                                                                                                        Subjective: no new c/o, denied cp, dyspnea, alert, awake, wife in attendance    Vitals:  T(C): 36.6 (03-08-19 @ 05:10), Max: 37.3 (03-07-19 @ 19:32)  HR: 70 (03-08-19 @ 05:10) (65 - 92)  BP: 112/70 (03-08-19 @ 05:10) (110/80 - 183/105)  RR: 16 (03-08-19 @ 05:10) (16 - 189)  SpO2: 97% (03-08-19 @ 05:10) (97% - 100%)  Wt(kg): --  I&O's Summary    Height (cm): 172.72 (03-08 @ 00:03)  Weight (kg): 70.4 (03-08 @ 00:03)  BMI (kg/m2): 23.6 (03-08 @ 00:03)  BSA (m2): 1.84 (03-08 @ 00:03)    PHYSICAL EXAM:  Appearance: Normal	  HEENT:   Atraumatic  Cardiovascular: Normal S1 S2, No JVD, No murmurs, No edema  Respiratory: Lungs clear to auscultation	  Gastrointestinal:  Soft, Non-tender, + BS	  Skin: No rashes, No ecchymoses, No cyanosis  Neurologic: Alert and awake, able to move extremities  Extremities: No edema    TELEMETRY: 	SR          CURRENT MEDICATIONS:  hydrALAZINE Injectable 10 milliGRAM(s) IV Push every 8 hours PRN  metoprolol tartrate 25 milliGRAM(s) Oral two times a day        acetaminophen   Tablet .. 650 milliGRAM(s) Oral every 6 hours PRN  ondansetron Injectable 4 milliGRAM(s) IV Push every 6 hours PRN      atorvastatin 40 milliGRAM(s) Oral at bedtime    aspirin  chewable 81 milliGRAM(s) Oral daily      LABS:	 	                            14.6   10.0  )-----------( 260      ( 08 Mar 2019 05:50 )             42.9     03-08    141  |  101  |  14.0  ----------------------------<  95  4.7   |  26.0  |  1.14    Ca    9.4      08 Mar 2019 05:50    TPro  7.0  /  Alb  4.4  /  TBili  0.4  /  DBili  x   /  AST  19  /  ALT  24  /  AlkPhos  80  03-07    proBNP: Serum Pro-Brain Natriuretic Peptide: 11 pg/mL (03-07 @ 12:21)    Lipid Profile: Date: 03-08 @ 05:50  Total cholesterol 234; Direct LDL: 134; HDL: 35; Triglycerides:325    HgA1c: Hemoglobin A1C, Whole Blood: 5.4 % (03-08 @ 05:50)         1. Left ventricular ejection fraction, by visual estimation, is 60 to   65%.   2. Technically adequate study.   3. Normal global left ventricular systolic function.   4. LV Ejection Fraction by Machado's Method with a biplane EF of 61 %.   5. Normal left ventricular internal cavity size.   6. Spectral Doppler shows impaired relaxation pattern of left   ventricular myocardial filling (Grade I diastolic dysfunction).   7. Normal right ventricular size and function.   8. There is no evidence of pericardial effusion.   9. There is no significant pericardial fat pad present.  10. Thickening of the anterior mitral valve leaflet.  11. Trace tricuspid regurgitation.  12. Trace pulmonic valve regurgitation.         EXAM:  CT ANGIO HEART W CORONARIES IC                          PROCEDURE DATE:  03/07/2019          INTERPRETATION:  History: 53 year old male with a history of risk for   coronary artery disease being evaluated for chest pain.    Procedure: Informed consent was obtained from the patient and there were   no complications during the procedure. ECG gated 64-detector CT of the   heart was initially performed without contrast administration. Utilizing   bolus tracking,  80 cc's of Omnipaque 350 was injected for ECG-gated   coronary CT angiography. The patient was pretreated with 20 mg of IV   metoprolol and 0.4 mg of sublingual nitroglycerin; resting heart rate at   time of the examination was 63 beats/min. Axial two dimensional and   three-dimensional images were reconstructed using an analysis   workstation. Study quality is adequate. Low-dose min dose study. DLP for   the entire CT study:331 mGycm; dose:4.6 mSv.     FINDINGS:  AORTA: No thoracic aortic dissection is noted in the visualized thoracic   aorta.; Mild aortic calcification. Ascending aorta diameter:sinus of   Valsalva:3.6 x 3.5 cm; Level of PA:2.7 x 2.74 cm; descending aorta:2.3 x   2.3 cm.  AORTIC VALVE: Trileaflet  PULMONARY ARTERIES: No pulmonary embolus is noted within the visualized   central pulmonary arteries.; PA diameter:2.73 cm  PERICARDIUM/CARDIAC STRUCTURES:  normal   Left atrium: Normal; Left atrial appendage: No thrombus visualized.    CALCIUM SCORE  Agatston Equivalent Score    Segment                                Score  --------------------------------------------------  Left Main                                60  Left anterior Descending          313  Circumflex                              97  Right                                      10  --------------------------------------------------  Total                                      480    Age/Sex Adjusted Percentile Rating ([place], Circulation 2000):   >90th   percentile;  coronary age: >70 years. Significant atherosclerotic burden.   Significant coronary calcification.    CORONARY:  DOMINANCE: right  LEFT MAIN CORONARY ARTERY: Distal: Noncalcified and calcified plaque.   20-30% stenosis.   ANTERIOR DESCENDING ARTERY: Ostial: Noncalcified and calcified plaque.   30% stenosis. Proximal: Noncalcified and calcified plaque. 40% stenosis.   Mid: Mild diffuse disease. Distal: Calcified plaque obscuring   visualization. Cannot rule out significant stenosis.  DIAGONAL 1:Small caliber vessel. Mild diffuse disease.; DIAGONAL 2: Large   caliber vessel. No significant stenosis.  RAMUS INTERMEDIUS: Ostial: Noncalcified plaque. 20%-30% stenosis.  CIRCUMFLEX ARTERY: Mid: Calcified plaque obscuring visualization. Cannot   rule out significant stenosis. . Obtuse marginal (OM): Not visualized  RIGHT CORONARY ARTERY:  Proximal: Soft plaque without significant   stenosis. Posterior descending artery (PDA):Not visualized     Myocardial Function:  The left ventricle is of normal size and wall thickness. Cine display   demonstrates no regional wall motion abnormality. LVEDV= 125 cc; LVESV=   32 cc. Calculated ejection fraction is 74%.      IMPRESSION:   Coronary artery calcium score:480. >90th percentile. Significant coronary   calcification.  Moderate obstructive coronary artery disease as described above.   Segments with severe calcification obscuring visualization. Cannot rule   out significant underlying stenosis.  Normal LV size and function.    For noncardiac structures, kindly refer to radiology addendum.    REFERENCE:  Calcium score of 0 implies no identifiable plaque with a very low risk of   coronary artery disease, generally less than 5%.  This does not   absolutely rule out the presence of atherosclerotic plaque, including   unstable plaque, but does imply a very low likelihood of occlusive   coronary atherosclerosis.    Calcium score of 1-10 implies minimal identifiable atherosclerotic plaque   with a very unlikely risk, less than 10%, of coronary artery disease.    Calcium score of  implies definite, at least mild atherosclerotic   plaque formation with a likelihood of mild or minimal coronary artery   narrowing.    Calcium score of 101-400 implies definite, at least moderate   atherosclerotic plaque formation with a high likelihood of mild coronary   artery disease with significant coronary artery narrowing possible.    Calcium score of 401 or higher implies extensive atherosclerotic plaque   formation with high likelihood of at least one significant coronary   artery narrowing.   Nela VAUGHAN et. al., Coronary Artery Calcium Scores on Electron Beam CT:   JACC 1999;33:415A  [facility] Proceedings, March 1999, Vol 74.  Findings based on EBCT data.  Issac PERSAUD, et. al., Evaluation of Subsecond Gated Helical CT for   Quantification of Coronary Artery Calcium and Comparison with Electron   Beam CT: AJR 2000;174: 915-92

## 2024-11-22 NOTE — ED PROVIDER NOTE - AGGRAVATING FACTORS
Show Topical Anesthesia Variable?: Yes Consent: The patient's consent was obtained including but not limited to risks of crusting, scabbing, blistering, scarring, darker or lighter pigmentary change, recurrence, incomplete removal and infection. Medical Necessity Information: It is in your best interest to select a reason for this procedure from the list below. All of these items fulfill various CMS LCD requirements except the new and changing color options. Number Of Freeze-Thaw Cycles: 2 freeze-thaw cycles Include Z78.9 (Other Specified Conditions Influencing Health Status) As An Associated Diagnosis?: No Medical Necessity Clause: This procedure was medically necessary because the lesions that were treated were: Post-Care Instructions: I reviewed with the patient in detail post-care instructions. Patient is to wear sunprotection, and avoid picking at any of the treated lesions. Pt may apply Vaseline to crusted or scabbing areas. Duration Of Freeze Thaw-Cycle (Seconds): 10 Spray Paint Text: The liquid nitrogen was applied to the skin utilizing a spray paint frosting technique. Detail Level: Detailed Number Of Freeze-Thaw Cycles: 1 freeze-thaw cycle none

## 2025-07-24 ENCOUNTER — APPOINTMENT (OUTPATIENT)
Facility: CLINIC | Age: 60
End: 2025-07-24
Payer: MEDICAID

## 2025-07-24 VITALS
WEIGHT: 167 LBS | HEART RATE: 106 BPM | OXYGEN SATURATION: 95 % | BODY MASS INDEX: 25.31 KG/M2 | SYSTOLIC BLOOD PRESSURE: 150 MMHG | DIASTOLIC BLOOD PRESSURE: 90 MMHG | HEIGHT: 68 IN

## 2025-07-24 DIAGNOSIS — R13.10 DYSPHAGIA, UNSPECIFIED: ICD-10-CM

## 2025-07-24 DIAGNOSIS — K22.5 DIVERTICULUM OF ESOPHAGUS, ACQUIRED: ICD-10-CM

## 2025-07-24 PROCEDURE — 99204 OFFICE O/P NEW MOD 45 MIN: CPT

## 2025-08-28 ENCOUNTER — APPOINTMENT (OUTPATIENT)
Facility: CLINIC | Age: 60
End: 2025-08-28

## 2025-08-28 ENCOUNTER — APPOINTMENT (OUTPATIENT)
Facility: CLINIC | Age: 60
End: 2025-08-28
Payer: MEDICAID

## 2025-08-28 VITALS
DIASTOLIC BLOOD PRESSURE: 70 MMHG | HEART RATE: 99 BPM | BODY MASS INDEX: 24.25 KG/M2 | HEIGHT: 68 IN | OXYGEN SATURATION: 98 % | WEIGHT: 160 LBS | SYSTOLIC BLOOD PRESSURE: 140 MMHG

## 2025-08-28 DIAGNOSIS — J38.7 OTHER DISEASES OF LARYNX: ICD-10-CM

## 2025-08-28 DIAGNOSIS — F17.200 NICOTINE DEPENDENCE, UNSPECIFIED, UNCOMPLICATED: ICD-10-CM

## 2025-08-28 PROCEDURE — 99213 OFFICE O/P EST LOW 20 MIN: CPT

## 2025-08-28 RX ORDER — VALSARTAN 160 MG/1
160 TABLET ORAL DAILY
Refills: 0 | Status: ACTIVE | COMMUNITY

## 2025-08-28 RX ORDER — CEFDINIR 300 MG/1
300 CAPSULE ORAL
Refills: 0 | Status: ACTIVE | COMMUNITY

## 2025-08-29 PROBLEM — J38.7 LARYNGEAL MASS: Status: ACTIVE | Noted: 2025-08-28

## 2025-09-04 ENCOUNTER — NON-APPOINTMENT (OUTPATIENT)
Age: 60
End: 2025-09-04

## 2025-09-05 ENCOUNTER — APPOINTMENT (OUTPATIENT)
Dept: GASTROENTEROLOGY | Facility: CLINIC | Age: 60
End: 2025-09-05

## 2025-09-06 ENCOUNTER — OUTPATIENT (OUTPATIENT)
Dept: OUTPATIENT SERVICES | Facility: HOSPITAL | Age: 60
LOS: 1 days | End: 2025-09-06
Payer: MEDICAID

## 2025-09-06 ENCOUNTER — APPOINTMENT (OUTPATIENT)
Dept: CT IMAGING | Facility: CLINIC | Age: 60
End: 2025-09-06

## 2025-09-06 DIAGNOSIS — Z98.890 OTHER SPECIFIED POSTPROCEDURAL STATES: Chronic | ICD-10-CM

## 2025-09-06 DIAGNOSIS — Z00.8 ENCOUNTER FOR OTHER GENERAL EXAMINATION: ICD-10-CM

## 2025-09-06 PROCEDURE — 70480 CT ORBIT/EAR/FOSSA W/O DYE: CPT

## 2025-09-06 PROCEDURE — 70491 CT SOFT TISSUE NECK W/DYE: CPT | Mod: 26

## 2025-09-06 PROCEDURE — 70491 CT SOFT TISSUE NECK W/DYE: CPT

## 2025-09-06 PROCEDURE — 70480 CT ORBIT/EAR/FOSSA W/O DYE: CPT | Mod: 26

## 2025-09-11 ENCOUNTER — APPOINTMENT (OUTPATIENT)
Dept: OTOLARYNGOLOGY | Facility: CLINIC | Age: 60
End: 2025-09-11
Payer: MEDICAID

## 2025-09-11 ENCOUNTER — NON-APPOINTMENT (OUTPATIENT)
Age: 60
End: 2025-09-11

## 2025-09-11 VITALS
HEART RATE: 93 BPM | HEIGHT: 68 IN | DIASTOLIC BLOOD PRESSURE: 79 MMHG | SYSTOLIC BLOOD PRESSURE: 116 MMHG | WEIGHT: 167 LBS | BODY MASS INDEX: 25.31 KG/M2

## 2025-09-11 DIAGNOSIS — J38.7 OTHER DISEASES OF LARYNX: ICD-10-CM

## 2025-09-11 DIAGNOSIS — Z78.9 OTHER SPECIFIED HEALTH STATUS: ICD-10-CM

## 2025-09-11 DIAGNOSIS — M54.30 SCIATICA, UNSPECIFIED SIDE: ICD-10-CM

## 2025-09-11 PROCEDURE — 99204 OFFICE O/P NEW MOD 45 MIN: CPT | Mod: 25

## 2025-09-11 PROCEDURE — 31575 DIAGNOSTIC LARYNGOSCOPY: CPT

## 2025-09-11 RX ORDER — CIPROFLOXACIN AND DEXAMETHASONE 3; 1 MG/ML; MG/ML
0.3-0.1 SUSPENSION/ DROPS AURICULAR (OTIC)
Refills: 0 | Status: ACTIVE | COMMUNITY

## 2025-09-19 ENCOUNTER — RESULT REVIEW (OUTPATIENT)
Age: 60
End: 2025-09-19

## 2025-09-19 ENCOUNTER — TRANSCRIPTION ENCOUNTER (OUTPATIENT)
Age: 60
End: 2025-09-19

## 2025-09-19 ENCOUNTER — APPOINTMENT (OUTPATIENT)
Dept: OTOLARYNGOLOGY | Facility: HOSPITAL | Age: 60
End: 2025-09-19

## 2025-09-25 PROBLEM — C32.0 MALIGNANT NEOPLASM OF VOCAL CORD: Status: ACTIVE | Noted: 2025-09-25

## 2025-09-26 PROBLEM — K22.5 DIVERTICULUM OF ESOPHAGUS, ACQUIRED: Chronic | Status: ACTIVE | Noted: 2025-09-16

## 2025-09-26 PROBLEM — M51.26 OTHER INTERVERTEBRAL DISC DISPLACEMENT, LUMBAR REGION: Chronic | Status: ACTIVE | Noted: 2025-09-16

## 2025-09-26 PROBLEM — R13.10 DYSPHAGIA, UNSPECIFIED: Chronic | Status: ACTIVE | Noted: 2025-09-16
